# Patient Record
Sex: MALE | Race: BLACK OR AFRICAN AMERICAN | NOT HISPANIC OR LATINO | Employment: PART TIME | URBAN - METROPOLITAN AREA
[De-identification: names, ages, dates, MRNs, and addresses within clinical notes are randomized per-mention and may not be internally consistent; named-entity substitution may affect disease eponyms.]

---

## 2019-06-04 ENCOUNTER — OFFICE VISIT (OUTPATIENT)
Dept: PODIATRY | Facility: CLINIC | Age: 74
End: 2019-06-04
Payer: MEDICARE

## 2019-06-04 VITALS
DIASTOLIC BLOOD PRESSURE: 70 MMHG | BODY MASS INDEX: 32.64 KG/M2 | SYSTOLIC BLOOD PRESSURE: 128 MMHG | WEIGHT: 228 LBS | HEIGHT: 70 IN

## 2019-06-04 DIAGNOSIS — I73.9 PERIPHERAL VASCULAR DISEASE: Primary | ICD-10-CM

## 2019-06-04 DIAGNOSIS — M20.5X2 HALLUX LIMITUS, ACQUIRED, LEFT: ICD-10-CM

## 2019-06-04 DIAGNOSIS — B35.1 ONYCHOMYCOSIS DUE TO DERMATOPHYTE: ICD-10-CM

## 2019-06-04 DIAGNOSIS — M20.42 HAMMER TOES OF BOTH FEET: ICD-10-CM

## 2019-06-04 DIAGNOSIS — M20.5X1 HALLUX LIMITUS, ACQUIRED, RIGHT: ICD-10-CM

## 2019-06-04 DIAGNOSIS — R60.0 BILATERAL LOWER EXTREMITY EDEMA: ICD-10-CM

## 2019-06-04 DIAGNOSIS — M20.41 HAMMER TOES OF BOTH FEET: ICD-10-CM

## 2019-06-04 PROCEDURE — 11721 PR DEBRIDEMENT OF NAILS, 6 OR MORE: ICD-10-PCS | Mod: Q9,S$GLB,, | Performed by: PODIATRIST

## 2019-06-04 PROCEDURE — 1101F PR PT FALLS ASSESS DOC 0-1 FALLS W/OUT INJ PAST YR: ICD-10-PCS | Mod: CPTII,S$GLB,, | Performed by: PODIATRIST

## 2019-06-04 PROCEDURE — 11721 DEBRIDE NAIL 6 OR MORE: CPT | Mod: Q9,S$GLB,, | Performed by: PODIATRIST

## 2019-06-04 PROCEDURE — 1101F PT FALLS ASSESS-DOCD LE1/YR: CPT | Mod: CPTII,S$GLB,, | Performed by: PODIATRIST

## 2019-06-04 PROCEDURE — 3074F PR MOST RECENT SYSTOLIC BLOOD PRESSURE < 130 MM HG: ICD-10-PCS | Mod: CPTII,S$GLB,, | Performed by: PODIATRIST

## 2019-06-04 PROCEDURE — 99203 OFFICE O/P NEW LOW 30 MIN: CPT | Mod: 25,S$GLB,, | Performed by: PODIATRIST

## 2019-06-04 PROCEDURE — 99203 PR OFFICE/OUTPT VISIT, NEW, LEVL III, 30-44 MIN: ICD-10-PCS | Mod: 25,S$GLB,, | Performed by: PODIATRIST

## 2019-06-04 PROCEDURE — 3074F SYST BP LT 130 MM HG: CPT | Mod: CPTII,S$GLB,, | Performed by: PODIATRIST

## 2019-06-04 PROCEDURE — 99999 PR PBB SHADOW E&M-EST. PATIENT-LVL III: ICD-10-PCS | Mod: PBBFAC,,, | Performed by: PODIATRIST

## 2019-06-04 PROCEDURE — 3078F DIAST BP <80 MM HG: CPT | Mod: CPTII,S$GLB,, | Performed by: PODIATRIST

## 2019-06-04 PROCEDURE — 3078F PR MOST RECENT DIASTOLIC BLOOD PRESSURE < 80 MM HG: ICD-10-PCS | Mod: CPTII,S$GLB,, | Performed by: PODIATRIST

## 2019-06-04 PROCEDURE — 99999 PR PBB SHADOW E&M-EST. PATIENT-LVL III: CPT | Mod: PBBFAC,,, | Performed by: PODIATRIST

## 2019-06-05 NOTE — PROGRESS NOTES
Subjective:      Patient ID: Humberto Lopez is a 73 y.o. male.    Chief Complaint: Foot Problem (Pcp Dr. Coyne 9/24/18) and Nail Care    Humberto Quan is a 73 y.o. male who presents to the clinic for evaluation and treatment of high risk feet. Humberto Quan has a past medical history of *Atrial fibrillation, Hyperlipidemia, Hypertension, Pacemaker, and Positive D dimer. The patient's chief complaint is long, thick toenails. This patient has documented high risk feet requiring routine maintenance secondary to peripheral neuropathy.    PCP: Wolf Coyne MD    Date Last Seen by PCP: 9/24/18  Chief Complaint   Patient presents with    Foot Problem     Pcp Dr. Coyne 9/24/18    Nail Care       Current shoe gear:  Worn tennis shoes  Last encounter in this department: Visit date not found    No results found for: HGBA1C          Patient Active Problem List   Diagnosis    HTN (hypertension)    Atrial fibrillation    Hyperlipidemia    Cardiac pacemaker in situ    Sleep apnea       Current Outpatient Medications on File Prior to Visit   Medication Sig Dispense Refill    allopurinol (ZYLOPRIM) 300 MG tablet Take by mouth. 1 Tablet Oral Every day      amiodarone (PACERONE) 200 MG Tab TAKE 1 TABLET BY MOUTH DAILY 90 tablet 0    amlodipine (NORVASC) 5 MG tablet TAKE 1 TABLET BY MOUTH DAILY 90 tablet 0    aspirin (ECOTRIN) 81 MG EC tablet Take 81 mg by mouth once daily.      azelastine (ASTELIN) 137 mcg nasal spray once as needed. 1 Aerosol, Spray Nasal      carvedilol (COREG) 3.125 MG tablet TAKE 1 TABLET BY MOUTH TWICE DAILY WITH A MEAL (Patient taking differently: TAKE 1 TABLET BY MOUTH TWICE DAILY) 180 tablet 0    carvedilol (COREG) 3.125 MG tablet TAKE 1 TABLET BY MOUTH TWICE DAILY 180 tablet 0    cetirizine (ZYRTEC) 5 MG chewable tablet Take 5 mg by mouth once as needed for Allergies.      CRESTOR 10 mg tablet TAKE ONE TABLET BY MOUTH DAILY 30 tablet 0    dextroamphetamine-amphetamine (ADDERALL) 20 mg  tablet TK 1 T PO D  0    dorzolamide (TRUSOPT) 2 % ophthalmic solution   0    ergocalciferol (ERGOCALCIFEROL) 50,000 unit Cap Take 50,000 Units by mouth every 7 days.      finasteride (PROSCAR) 5 mg tablet Take 5 mg by mouth once daily.   4    fluorometholone 0.1% (FML) 0.1 % DrpS   1    fluticasone (FLONASE) 50 mcg/actuation nasal spray Inhale 1 spray into the lungs Daily.      furosemide (LASIX) 40 MG tablet TAKE 1 TABLET BY MOUTH DAILY 90 tablet 0    ketoconazole (NIZORAL) 2 % cream Apply topically 2 (two) times daily. Apply  to rash twice a day 30 g 1    latanoprost 0.005 % ophthalmic solution   0    levocetirizine (XYZAL) 5 MG tablet Take 5 mg by mouth every evening.      losartan (COZAAR) 100 MG tablet Take 1 tablet (100 mg total) by mouth once daily. 90 tablet 2    pantoprazole (PROTONIX) 40 MG tablet Take 40 mg by mouth once daily.      PATANOL 0.1 % ophthalmic solution   0    tamsulosin (FLOMAX) 0.4 mg Cp24 Take 1 tablet by mouth once daily.      tizanidine 2 mg Cap Take 2 mg by mouth 3 (three) times daily.      tramadol (ULTRAM) 50 mg tablet Take 50 mg by mouth every 6 (six) hours as needed for Pain.      trazodone (DESYREL) 50 MG tablet TK 1 T PO QHS  3     No current facility-administered medications on file prior to visit.        Review of patient's allergies indicates:   Allergen Reactions    No known drug allergies        Past Surgical History:   Procedure Laterality Date    A-V CARDIAC PACEMAKER INSERTION      APPENDECTOMY      CARPAL TUNNEL RELEASE      ELBOW SURGERY      TONSILLECTOMY         Family History   Problem Relation Age of Onset    Cancer Mother     Cancer Father     Heart disease Brother        Social History     Socioeconomic History    Marital status:      Spouse name: Not on file    Number of children: Not on file    Years of education: Not on file    Highest education level: Not on file   Occupational History    Not on file   Social Needs     "Financial resource strain: Not on file    Food insecurity:     Worry: Not on file     Inability: Not on file    Transportation needs:     Medical: Not on file     Non-medical: Not on file   Tobacco Use    Smoking status: Former Smoker    Smokeless tobacco: Never Used   Substance and Sexual Activity    Alcohol use: Yes     Comment: occasional    Drug use: No    Sexual activity: Not on file   Lifestyle    Physical activity:     Days per week: Not on file     Minutes per session: Not on file    Stress: Not on file   Relationships    Social connections:     Talks on phone: Not on file     Gets together: Not on file     Attends [employer] service: Not on file     Active member of club or organization: Not on file     Attends meetings of clubs or organizations: Not on file     Relationship status: Not on file   Other Topics Concern    Not on file   Social History Narrative    Not on file       Review of Systems   Constitution: Negative for chills and fever.   Cardiovascular: Positive for leg swelling. Negative for claudication.   Respiratory: Negative for cough and shortness of breath.    Skin: Positive for dry skin and nail changes. Negative for itching and rash.   Musculoskeletal: Positive for arthritis, back pain, joint pain, joint swelling, muscle weakness, myalgias, neck pain and stiffness. Negative for falls.   Gastrointestinal: Negative for diarrhea, nausea and vomiting.   Neurological: Positive for numbness and paresthesias. Negative for tremors and weakness.   Psychiatric/Behavioral: Negative for altered mental status and hallucinations.           Objective:      Vitals:    06/04/19 1537   BP: 128/70   Weight: 103.4 kg (228 lb)   Height: 5' 10" (1.778 m)   PainSc: 0-No pain       Physical Exam   Constitutional: He appears well-developed and well-nourished.  Non-toxic appearance. He does not have a sickly appearance. No distress.   alert and oriented x 3.    Cardiovascular:   Pulses:       Dorsalis " pedis pulses are 1+ on the right side, and 1+ on the left side.        Posterior tibial pulses are 1+ on the right side, and 1+ on the left side.    Dorsalis pedis and posterior tibial pulses are diminished Bilaterally. Toes are cool to touch. Feet are warm proximally.There is decreased digital hair. Skin is atrophic, slightly hyperpigmented, and mildly edematous     Pulmonary/Chest: No respiratory distress.   Musculoskeletal: He exhibits no deformity.        Right ankle: He exhibits swelling. No tenderness. No lateral malleolus, no medial malleolus, no AITFL, no CF ligament and no posterior TFL tenderness found. Achilles tendon exhibits no pain, no defect and normal Berry's test results.        Left ankle: He exhibits swelling. No tenderness. No lateral malleolus, no medial malleolus, no AITFL, no CF ligament and no posterior TFL tenderness found. Achilles tendon exhibits no pain, no defect and normal Berry's test results.        Right foot: There is no tenderness and no bony tenderness.        Left foot: There is no tenderness and no bony tenderness.   Muscle strength is 4/5 in all groups bilaterally.    There is equinus deformity bilateral with decreased dorsiflexion at the ankle joint bilateral. Gait analysis reveals excessive pronation through midstance and propulsion with early heel off. Shoes reveals lateral heel counter wear bilateral     Decreased first MPJ range of motion both weightbearing and nonweightbearing, no crepitus observed the first MP joint, + dorsal flag sign. Mild  bunion deformity is observed .    Patient has hammertoes of digits 2-5 bilateral partially reducible            Feet:   Right Foot:   Protective Sensation: 5 sites tested. 5 sites sensed.   Left Foot:   Protective Sensation: 5 sites tested. 5 sites sensed.   Lymphadenopathy:   No lymphatic streaking     Neurological: He displays no atrophy. A sensory deficit is present.   Paresthesias, and hyperesthesia bilateral feet at toes  with no clearly identified trigger or source.    [place] 5.07 monofilamant testing is diminished Brandon feet.        Skin: Skin is warm, dry and intact. No abrasion, no bruising, no lesion and no rash noted. He is not diaphoretic. No cyanosis or erythema. No pallor. Nails show no clubbing.   Toenails 1-5 bilaterally are elongated by 2-3 mm, thickened by 2-3 mm, discolored/yellowed, dystrophic, brittle with subungual debris. Tender to distal nail plate pressure, without periungual skin abnormality of each.     Psychiatric: His mood appears not anxious. His affect is not inappropriate. His speech is not slurred. He is not combative. He is communicative. He is attentive.   Nursing note and vitals reviewed.            Assessment:       Encounter Diagnoses   Name Primary?    Peripheral vascular disease Yes    Bilateral lower extremity edema     Onychomycosis due to dermatophyte     Hallux limitus, acquired, left     Hallux limitus, acquired, right     Hammer toes of both feet          Plan:     Problem List Items Addressed This Visit     None      Visit Diagnoses     Peripheral vascular disease    -  Primary    Bilateral lower extremity edema        Onychomycosis due to dermatophyte        Hallux limitus, acquired, left        Hallux limitus, acquired, right        Hammer toes of both feet             I counseled the patient on his conditions, their implications and medical management.         Greater than 50% of this visit spent on counseling and coordination of care.    Education about the diabetic foot, neuropathy, and prevention of limb loss.    Shoe inspection. Patient instructed on proper foot hygeine. Wear comfortable, proper fitting shoes. Wash feet daily. Dry well. After drying, apply moisturizer to feet (no lotion to webspaces). Inspect feet daily for skin breaks, blisters, swelling, or redness. Wear cotton socks (preferably white)  Change socks every day. Do NOT walk barefoot. Do NOT use heating  pads or hot water soaks. We discussed wearing proper shoe gear, daily foot inspections, never walking without protective shoe gear. The patient is asked to make an attempt to improve diet and exercise patterns to aid in medical management of this problem.    Discussed edema control and the importance of daily moisturizer to the feet such as Gold bonds diabetic foot cream    Recommend applying vicks vaporub to thick abnormal toenails daily x 6 months to treat fungal nail infection.    With patient's permission, nails were aggressively reduced and debrided x 10 to their soft tissue attachment mechanically and with electric , removing all offending nail and debris. Patient relates relief following the procedure.     He will continue to monitor the areas daily, inspect his feet, wear protective shoe gear when ambulatory, moisturizer to maintain skin integrity and follow in this office in approximately 3-5 months, sooner p.r.n.

## 2020-10-26 ENCOUNTER — HOSPITAL ENCOUNTER (EMERGENCY)
Facility: HOSPITAL | Age: 75
Discharge: HOME | End: 2020-10-26
Attending: EMERGENCY MEDICINE
Payer: MEDICARE

## 2020-10-26 VITALS
DIASTOLIC BLOOD PRESSURE: 76 MMHG | HEART RATE: 70 BPM | OXYGEN SATURATION: 95 % | TEMPERATURE: 98 F | BODY MASS INDEX: 30.13 KG/M2 | RESPIRATION RATE: 16 BRPM | WEIGHT: 210 LBS | SYSTOLIC BLOOD PRESSURE: 179 MMHG

## 2020-10-26 DIAGNOSIS — S81.802A AVULSION OF SKIN OF LEFT LOWER LEG, INITIAL ENCOUNTER: Primary | ICD-10-CM

## 2020-10-26 PROCEDURE — 99283 EMERGENCY DEPT VISIT LOW MDM: CPT | Mod: ER

## 2020-10-26 PROCEDURE — 25000003 PHARM REV CODE 250: Mod: ER | Performed by: NURSE PRACTITIONER

## 2020-10-26 RX ORDER — MUPIROCIN 20 MG/G
OINTMENT TOPICAL
Status: COMPLETED | OUTPATIENT
Start: 2020-10-26 | End: 2020-10-26

## 2020-10-26 RX ADMIN — MUPIROCIN: 20 OINTMENT TOPICAL at 05:10

## 2020-10-26 NOTE — ED PROVIDER NOTES
"Encounter Date: 10/26/2020    SCRIBE #1 NOTE: I, Felicity Casanovalayo, am scribing for, and in the presence of,  ELIESER Antonio. I have scribed the following portions of the note - Other sections scribed: HPI, ROS, PE.       History     Chief Complaint   Patient presents with    leg wound     Pt states," I have a wound on my left leg that has opened."     This is a nontoxic appearing 75 y.o. male who presents to the ED complaining of an open wound to the left leg onset today. Patient reports he initially had a burn to his left leg that healed and was treated and states he recently rubbed his leg against something, causing the wound to open.     The history is provided by the patient. No  was used.   General Injury   The incident occurred 3 to 5 hours ago. The incident occurred at home. Injury mechanism: hit leg against door-old wound opened. There is an injury to the left lower leg. The pain is at a severity of 0/10. Pertinent negatives include no chest pain, no nausea, no vomiting, no seizures, no weakness and no cough. His tetanus status is UTD.     Review of patient's allergies indicates:   Allergen Reactions    No known drug allergies      Past Medical History:   Diagnosis Date    *Atrial fibrillation     Hyperlipidemia     Hypertension     Pacemaker     Positive D dimer      Past Surgical History:   Procedure Laterality Date    A-V CARDIAC PACEMAKER INSERTION      APPENDECTOMY      CARPAL TUNNEL RELEASE      ELBOW SURGERY      TONSILLECTOMY       Family History   Problem Relation Age of Onset    Cancer Mother     Cancer Father     Heart disease Brother      Social History     Tobacco Use    Smoking status: Former Smoker    Smokeless tobacco: Never Used   Substance Use Topics    Alcohol use: Yes     Comment: occasional    Drug use: No     Review of Systems   Constitutional: Negative.  Negative for fever.   HENT: Negative.    Eyes: Negative.    Respiratory: Negative.  Negative for " cough and shortness of breath.    Cardiovascular: Negative.  Negative for chest pain and leg swelling.   Gastrointestinal: Negative.  Negative for nausea and vomiting.   Endocrine: Negative.    Genitourinary: Negative.    Musculoskeletal: Negative.  Negative for myalgias.   Skin: Positive for wound (left leg).   Allergic/Immunologic: Negative.    Neurological: Negative.  Negative for seizures and weakness.   Hematological: Negative.    Psychiatric/Behavioral: Negative.    All other systems reviewed and are negative.      Physical Exam     Initial Vitals [10/26/20 1634]   BP Pulse Resp Temp SpO2   (!) 179/76 70 16 98.1 °F (36.7 °C) 95 %      MAP       --         Physical Exam    Nursing note and vitals reviewed.  Constitutional: He appears well-developed.   HENT:   Head: Normocephalic.   Nose: Nose normal.   Mouth/Throat: Oropharynx is clear and moist.   Eyes: Conjunctivae are normal.   Neck: Normal range of motion. Neck supple.   Cardiovascular: Normal rate, regular rhythm, S1 normal, S2 normal and normal heart sounds. Exam reveals no gallop and no friction rub.    No murmur heard.  Pulmonary/Chest: Breath sounds normal. No respiratory distress. He has no wheezes. He has no rhonchi. He has no rales.   Abdominal: Soft. Bowel sounds are normal. There is no abdominal tenderness.   Musculoskeletal: Normal range of motion.      Comments: 2 cm skin break with no drainage or any evidence of infection noted.   Neurological: He is alert and oriented to person, place, and time.   Skin: Skin is warm and dry.   2 cm skin break noted to left lateral leg.  Loose skin removed.   LLE with FROM. No deformity    Psychiatric: He has a normal mood and affect. His behavior is normal.         ED Course   Procedures  Labs Reviewed - No data to display       Imaging Results    None          Medical Decision Making:   History:   Old Medical Records: I decided to obtain old medical records.  Initial Assessment:   This is a nontoxic appearing  75 y.o. male who presents to the ED complaining of an open wound to the left leg onset today. Patient reports he initially had a burn to his left leg that healed and was treated and states he recently rubbed his leg against something, causing the wound to open.   Differential Diagnosis:   Cellulitis. Abscess. Skin tear  ED Management:  Physical exam.  Wound care performed.  Bactroban applied.  Discharge with Bactroban.  Patient instructed on daily wound care.  Follow-up with PCP in 2 days.             Scribe Attestation:   Scribe #1: I performed the above scribed service and the documentation accurately describes the services I performed. I attest to the accuracy of the note.    This document was produced by a scribe under my direction and in my presence. I agree with the content of the note and have made any necessary edits.     ELIESER Antonio    10/26/2020 9:49 PM                  Clinical Impression:       ICD-10-CM ICD-9-CM   1. Avulsion of skin of left lower leg, initial encounter  S81.802A 891.0                          ED Disposition Condition    Discharge Stable        ED Prescriptions     None        Follow-up Information     Follow up With Specialties Details Why Contact Info    Wolf Coyne MD Internal Medicine In 2 days  [address]  4th Floor  [facility] [address]  [phone]                                         ELIESER Henry  10/26/20 4701

## 2022-11-19 ENCOUNTER — HOSPITAL ENCOUNTER (OUTPATIENT)
Facility: HOSPITAL | Age: 77
Discharge: HOME | End: 2022-11-21
Attending: STUDENT IN AN ORGANIZED HEALTH CARE EDUCATION/TRAINING PROGRAM | Admitting: STUDENT IN AN ORGANIZED HEALTH CARE EDUCATION/TRAINING PROGRAM
Payer: MEDICARE

## 2022-11-19 DIAGNOSIS — E78.5 HYPERLIPIDEMIA: ICD-10-CM

## 2022-11-19 DIAGNOSIS — R05.9 COUGH: ICD-10-CM

## 2022-11-19 DIAGNOSIS — I48.0 PAROXYSMAL ATRIAL FIBRILLATION: ICD-10-CM

## 2022-11-19 DIAGNOSIS — Z95.0 CARDIAC PACEMAKER IN SITU: ICD-10-CM

## 2022-11-19 DIAGNOSIS — J06.9 URI (UPPER RESPIRATORY INFECTION): ICD-10-CM

## 2022-11-19 DIAGNOSIS — G47.30 SLEEP APNEA: ICD-10-CM

## 2022-11-19 DIAGNOSIS — I10 ESSENTIAL HYPERTENSION: ICD-10-CM

## 2022-11-19 DIAGNOSIS — E87.70 VOLUME OVERLOAD: Primary | ICD-10-CM

## 2022-11-19 DIAGNOSIS — E87.70 FLUID OVERLOAD, UNSPECIFIED: ICD-10-CM

## 2022-11-19 DIAGNOSIS — R07.9 CHEST PAIN: ICD-10-CM

## 2022-11-19 LAB
ALBUMIN SERPL BCP-MCNC: 3.6 G/DL (ref 3.5–5.2)
ALP SERPL-CCNC: 56 U/L (ref 55–135)
ALT SERPL W/O P-5'-P-CCNC: 15 U/L (ref 10–44)
ANION GAP SERPL CALC-SCNC: 6 MMOL/L (ref 8–16)
AST SERPL-CCNC: 11 U/L (ref 10–40)
BASOPHILS # BLD AUTO: 0.03 K/UL (ref 0–0.2)
BASOPHILS NFR BLD: 0.4 % (ref 0–1.9)
BILIRUB SERPL-MCNC: 0.3 MG/DL (ref 0.1–1)
BNP SERPL-MCNC: 233 PG/ML (ref 0–99)
BUN SERPL-MCNC: 14 MG/DL (ref 8–23)
CALCIUM SERPL-MCNC: 8.6 MG/DL (ref 8.7–10.5)
CHLORIDE SERPL-SCNC: 99 MMOL/L (ref 95–110)
CO2 SERPL-SCNC: 35 MMOL/L (ref 23–29)
CREAT SERPL-MCNC: 0.8 MG/DL (ref 0.5–1.4)
D DIMER PPP IA.FEU-MCNC: 0.63 MG/L FEU
DIFFERENTIAL METHOD: ABNORMAL
EOSINOPHIL # BLD AUTO: 0.1 K/UL (ref 0–0.5)
EOSINOPHIL NFR BLD: 1.3 % (ref 0–8)
ERYTHROCYTE [DISTWIDTH] IN BLOOD BY AUTOMATED COUNT: 12.8 % (ref 11.5–14.5)
EST. GFR  (NO RACE VARIABLE): >60 ML/MIN/1.73 M^2
GLUCOSE SERPL-MCNC: 144 MG/DL (ref 70–110)
HCT VFR BLD AUTO: 41.4 % (ref 40–54)
HGB BLD-MCNC: 13.4 G/DL (ref 14–18)
IMM GRANULOCYTES # BLD AUTO: 0.03 K/UL (ref 0–0.04)
IMM GRANULOCYTES NFR BLD AUTO: 0.4 % (ref 0–0.5)
LYMPHOCYTES # BLD AUTO: 2.1 K/UL (ref 1–4.8)
LYMPHOCYTES NFR BLD: 27.2 % (ref 18–48)
MAGNESIUM SERPL-MCNC: 1.9 MG/DL (ref 1.6–2.6)
MCH RBC QN AUTO: 30.4 PG (ref 27–31)
MCHC RBC AUTO-ENTMCNC: 32.4 G/DL (ref 32–36)
MCV RBC AUTO: 94 FL (ref 82–98)
MONOCYTES # BLD AUTO: 0.7 K/UL (ref 0.3–1)
MONOCYTES NFR BLD: 9.3 % (ref 4–15)
NEUTROPHILS # BLD AUTO: 4.6 K/UL (ref 1.8–7.7)
NEUTROPHILS NFR BLD: 61.4 % (ref 38–73)
NRBC BLD-RTO: 0 /100 WBC
PLATELET # BLD AUTO: 152 K/UL (ref 150–450)
PMV BLD AUTO: 10.4 FL (ref 9.2–12.9)
POTASSIUM SERPL-SCNC: 4.3 MMOL/L (ref 3.5–5.1)
PROT SERPL-MCNC: 6.5 G/DL (ref 6–8.4)
RBC # BLD AUTO: 4.41 M/UL (ref 4.6–6.2)
SODIUM SERPL-SCNC: 140 MMOL/L (ref 136–145)
TROPONIN I SERPL DL<=0.01 NG/ML-MCNC: 0.01 NG/ML (ref 0–0.03)
WBC # BLD AUTO: 7.54 K/UL (ref 3.9–12.7)

## 2022-11-19 PROCEDURE — 99285 EMERGENCY DEPT VISIT HI MDM: CPT | Mod: 25

## 2022-11-19 PROCEDURE — 83880 ASSAY OF NATRIURETIC PEPTIDE: CPT | Performed by: STUDENT IN AN ORGANIZED HEALTH CARE EDUCATION/TRAINING PROGRAM

## 2022-11-19 PROCEDURE — 93005 ELECTROCARDIOGRAM TRACING: CPT

## 2022-11-19 PROCEDURE — 93010 ELECTROCARDIOGRAM REPORT: CPT | Mod: ,,, | Performed by: INTERNAL MEDICINE

## 2022-11-19 PROCEDURE — G0378 HOSPITAL OBSERVATION PER HR: HCPCS

## 2022-11-19 PROCEDURE — 83735 ASSAY OF MAGNESIUM: CPT | Performed by: STUDENT IN AN ORGANIZED HEALTH CARE EDUCATION/TRAINING PROGRAM

## 2022-11-19 PROCEDURE — 85025 COMPLETE CBC W/AUTO DIFF WBC: CPT | Performed by: STUDENT IN AN ORGANIZED HEALTH CARE EDUCATION/TRAINING PROGRAM

## 2022-11-19 PROCEDURE — 85379 FIBRIN DEGRADATION QUANT: CPT | Performed by: STUDENT IN AN ORGANIZED HEALTH CARE EDUCATION/TRAINING PROGRAM

## 2022-11-19 PROCEDURE — 80053 COMPREHEN METABOLIC PANEL: CPT | Performed by: STUDENT IN AN ORGANIZED HEALTH CARE EDUCATION/TRAINING PROGRAM

## 2022-11-19 PROCEDURE — 93010 EKG 12-LEAD: ICD-10-PCS | Mod: ,,, | Performed by: INTERNAL MEDICINE

## 2022-11-19 PROCEDURE — 96374 THER/PROPH/DIAG INJ IV PUSH: CPT

## 2022-11-19 PROCEDURE — 84484 ASSAY OF TROPONIN QUANT: CPT | Performed by: STUDENT IN AN ORGANIZED HEALTH CARE EDUCATION/TRAINING PROGRAM

## 2022-11-19 PROCEDURE — 63600175 PHARM REV CODE 636 W HCPCS: Performed by: STUDENT IN AN ORGANIZED HEALTH CARE EDUCATION/TRAINING PROGRAM

## 2022-11-19 RX ORDER — FUROSEMIDE 10 MG/ML
80 INJECTION INTRAMUSCULAR; INTRAVENOUS
Status: COMPLETED | OUTPATIENT
Start: 2022-11-19 | End: 2022-11-19

## 2022-11-19 RX ADMIN — FUROSEMIDE 80 MG: 10 INJECTION, SOLUTION INTRAVENOUS at 11:11

## 2022-11-20 LAB
ALBUMIN SERPL BCP-MCNC: 3.9 G/DL (ref 3.5–5.2)
ALP SERPL-CCNC: 53 U/L (ref 55–135)
ALT SERPL W/O P-5'-P-CCNC: 17 U/L (ref 10–44)
ANION GAP SERPL CALC-SCNC: 12 MMOL/L (ref 8–16)
ASCENDING AORTA: 3.29 CM
AST SERPL-CCNC: 15 U/L (ref 10–40)
AV INDEX (PROSTH): 0.92
AV MEAN GRADIENT: 2 MMHG
AV PEAK GRADIENT: 4 MMHG
AV VALVE AREA: 2.82 CM2
AV VELOCITY RATIO: 0.84
BILIRUB SERPL-MCNC: 0.5 MG/DL (ref 0.1–1)
BILIRUB UR QL STRIP: NEGATIVE
BSA FOR ECHO PROCEDURE: 2.08 M2
BUN SERPL-MCNC: 13 MG/DL (ref 8–23)
CALCIUM SERPL-MCNC: 9.1 MG/DL (ref 8.7–10.5)
CHLORIDE SERPL-SCNC: 96 MMOL/L (ref 95–110)
CLARITY UR: CLEAR
CO2 SERPL-SCNC: 37 MMOL/L (ref 23–29)
COLOR UR: YELLOW
CREAT SERPL-MCNC: 0.7 MG/DL (ref 0.5–1.4)
CV ECHO LV RWT: 0.57 CM
DOP CALC AO PEAK VEL: 1.05 M/S
DOP CALC AO VTI: 15.6 CM
DOP CALC LVOT AREA: 3.1 CM2
DOP CALC LVOT DIAMETER: 1.98 CM
DOP CALC LVOT PEAK VEL: 0.88 M/S
DOP CALC LVOT STROKE VOLUME: 44.01 CM3
DOP CALCLVOT PEAK VEL VTI: 14.3 CM
ECHO LV POSTERIOR WALL: 1.15 CM (ref 0.6–1.1)
EJECTION FRACTION: 55 %
EST. GFR  (NO RACE VARIABLE): >60 ML/MIN/1.73 M^2
FRACTIONAL SHORTENING: 26 % (ref 28–44)
GLUCOSE SERPL-MCNC: 98 MG/DL (ref 70–110)
GLUCOSE UR QL STRIP: NEGATIVE
HGB UR QL STRIP: NEGATIVE
INTERVENTRICULAR SEPTUM: 1.39 CM (ref 0.6–1.1)
IVC DIAMETER: 2.4 CM
KETONES UR QL STRIP: NEGATIVE
LA MAJOR: 5.96 CM
LA MINOR: 6.01 CM
LA WIDTH: 4.5 CM
LEFT ATRIUM SIZE: 3.92 CM
LEFT ATRIUM VOLUME INDEX: 43.6 ML/M2
LEFT ATRIUM VOLUME: 89.74 CM3
LEFT INTERNAL DIMENSION IN SYSTOLE: 2.97 CM (ref 2.1–4)
LEFT VENTRICLE DIASTOLIC VOLUME INDEX: 34.64 ML/M2
LEFT VENTRICLE DIASTOLIC VOLUME: 71.35 ML
LEFT VENTRICLE MASS INDEX: 88 G/M2
LEFT VENTRICLE SYSTOLIC VOLUME INDEX: 16.5 ML/M2
LEFT VENTRICLE SYSTOLIC VOLUME: 34.04 ML
LEFT VENTRICULAR INTERNAL DIMENSION IN DIASTOLE: 4.03 CM (ref 3.5–6)
LEFT VENTRICULAR MASS: 182.09 G
LEUKOCYTE ESTERASE UR QL STRIP: NEGATIVE
LVOT MG: 1.8 MMHG
LVOT MV: 0.64 CM/S
MAGNESIUM SERPL-MCNC: 1.9 MG/DL (ref 1.6–2.6)
MV PEAK E VEL: 0.65 M/S
NITRITE UR QL STRIP: NEGATIVE
PH UR STRIP: 6 [PH] (ref 5–8)
PHOSPHATE SERPL-MCNC: 5.2 MG/DL (ref 2.7–4.5)
PISA TR MAX VEL: 2.37 M/S
POTASSIUM SERPL-SCNC: 4.4 MMOL/L (ref 3.5–5.1)
PROT SERPL-MCNC: 7.2 G/DL (ref 6–8.4)
PROT UR QL STRIP: NEGATIVE
PV PEAK VELOCITY: 0.91 CM/S
RA MAJOR: 5.95 CM
RA PRESSURE: 15 MMHG
RA WIDTH: 3.6 CM
RIGHT VENTRICULAR END-DIASTOLIC DIMENSION: 3.74 CM
RV TISSUE DOPPLER FREE WALL SYSTOLIC VELOCITY 1 (APICAL 4 CHAMBER VIEW): 0.01 CM/S
SINUS: 3.58 CM
SODIUM SERPL-SCNC: 145 MMOL/L (ref 136–145)
SP GR UR STRIP: 1.01 (ref 1–1.03)
STJ: 2.43 CM
TR MAX PG: 22 MMHG
TRICUSPID ANNULAR PLANE SYSTOLIC EXCURSION: 1.19 CM
TV REST PULMONARY ARTERY PRESSURE: 37 MMHG
URN SPEC COLLECT METH UR: NORMAL
UROBILINOGEN UR STRIP-ACNC: NEGATIVE EU/DL

## 2022-11-20 PROCEDURE — 25000003 PHARM REV CODE 250: Performed by: NURSE PRACTITIONER

## 2022-11-20 PROCEDURE — G0378 HOSPITAL OBSERVATION PER HR: HCPCS

## 2022-11-20 PROCEDURE — 25000003 PHARM REV CODE 250: Performed by: HOSPITALIST

## 2022-11-20 PROCEDURE — 97165 OT EVAL LOW COMPLEX 30 MIN: CPT

## 2022-11-20 PROCEDURE — 36415 COLL VENOUS BLD VENIPUNCTURE: CPT | Performed by: NURSE PRACTITIONER

## 2022-11-20 PROCEDURE — 96372 THER/PROPH/DIAG INJ SC/IM: CPT | Performed by: NURSE PRACTITIONER

## 2022-11-20 PROCEDURE — 96376 TX/PRO/DX INJ SAME DRUG ADON: CPT

## 2022-11-20 PROCEDURE — 83735 ASSAY OF MAGNESIUM: CPT | Performed by: NURSE PRACTITIONER

## 2022-11-20 PROCEDURE — 81003 URINALYSIS AUTO W/O SCOPE: CPT | Performed by: STUDENT IN AN ORGANIZED HEALTH CARE EDUCATION/TRAINING PROGRAM

## 2022-11-20 PROCEDURE — 80053 COMPREHEN METABOLIC PANEL: CPT | Performed by: NURSE PRACTITIONER

## 2022-11-20 PROCEDURE — 63600175 PHARM REV CODE 636 W HCPCS: Performed by: HOSPITALIST

## 2022-11-20 PROCEDURE — 97530 THERAPEUTIC ACTIVITIES: CPT

## 2022-11-20 PROCEDURE — 84100 ASSAY OF PHOSPHORUS: CPT | Performed by: NURSE PRACTITIONER

## 2022-11-20 PROCEDURE — 63600175 PHARM REV CODE 636 W HCPCS: Performed by: NURSE PRACTITIONER

## 2022-11-20 RX ORDER — FUROSEMIDE 10 MG/ML
20 INJECTION INTRAMUSCULAR; INTRAVENOUS
Status: DISCONTINUED | OUTPATIENT
Start: 2022-11-20 | End: 2022-11-21 | Stop reason: HOSPADM

## 2022-11-20 RX ORDER — NALOXONE HCL 0.4 MG/ML
0.02 VIAL (ML) INJECTION
Status: DISCONTINUED | OUTPATIENT
Start: 2022-11-20 | End: 2022-11-21 | Stop reason: HOSPADM

## 2022-11-20 RX ORDER — LEVOTHYROXINE SODIUM 50 UG/1
50 TABLET ORAL
COMMUNITY

## 2022-11-20 RX ORDER — GABAPENTIN 300 MG/1
300 CAPSULE ORAL 2 TIMES DAILY
Status: DISCONTINUED | OUTPATIENT
Start: 2022-11-20 | End: 2022-11-21 | Stop reason: HOSPADM

## 2022-11-20 RX ORDER — IBUPROFEN 400 MG/1
400 TABLET ORAL EVERY 6 HOURS PRN
Status: DISCONTINUED | OUTPATIENT
Start: 2022-11-20 | End: 2022-11-21 | Stop reason: HOSPADM

## 2022-11-20 RX ORDER — TALC
6 POWDER (GRAM) TOPICAL NIGHTLY PRN
Status: DISCONTINUED | OUTPATIENT
Start: 2022-11-20 | End: 2022-11-21 | Stop reason: HOSPADM

## 2022-11-20 RX ORDER — HYDRALAZINE HYDROCHLORIDE 25 MG/1
50 TABLET, FILM COATED ORAL EVERY 8 HOURS
Status: DISCONTINUED | OUTPATIENT
Start: 2022-11-20 | End: 2022-11-21 | Stop reason: HOSPADM

## 2022-11-20 RX ORDER — TADALAFIL 10 MG/1
10 TABLET ORAL DAILY PRN
COMMUNITY

## 2022-11-20 RX ORDER — ACETAMINOPHEN 325 MG/1
650 TABLET ORAL EVERY 4 HOURS PRN
Status: DISCONTINUED | OUTPATIENT
Start: 2022-11-20 | End: 2022-11-21 | Stop reason: HOSPADM

## 2022-11-20 RX ORDER — GLUCAGON 1 MG
1 KIT INJECTION
Status: DISCONTINUED | OUTPATIENT
Start: 2022-11-20 | End: 2022-11-21 | Stop reason: HOSPADM

## 2022-11-20 RX ORDER — ONDANSETRON 2 MG/ML
4 INJECTION INTRAMUSCULAR; INTRAVENOUS EVERY 8 HOURS PRN
Status: DISCONTINUED | OUTPATIENT
Start: 2022-11-20 | End: 2022-11-21 | Stop reason: HOSPADM

## 2022-11-20 RX ORDER — IBUPROFEN 200 MG
24 TABLET ORAL
Status: DISCONTINUED | OUTPATIENT
Start: 2022-11-20 | End: 2022-11-21 | Stop reason: HOSPADM

## 2022-11-20 RX ORDER — LEVOTHYROXINE SODIUM 50 UG/1
50 TABLET ORAL
Status: DISCONTINUED | OUTPATIENT
Start: 2022-11-20 | End: 2022-11-21 | Stop reason: HOSPADM

## 2022-11-20 RX ORDER — ASPIRIN 81 MG/1
81 TABLET ORAL DAILY
Status: DISCONTINUED | OUTPATIENT
Start: 2022-11-20 | End: 2022-11-21 | Stop reason: HOSPADM

## 2022-11-20 RX ORDER — FUROSEMIDE 10 MG/ML
40 INJECTION INTRAMUSCULAR; INTRAVENOUS
Status: DISCONTINUED | OUTPATIENT
Start: 2022-11-20 | End: 2022-11-20

## 2022-11-20 RX ORDER — ENOXAPARIN SODIUM 100 MG/ML
40 INJECTION SUBCUTANEOUS EVERY 24 HOURS
Status: DISCONTINUED | OUTPATIENT
Start: 2022-11-20 | End: 2022-11-21 | Stop reason: HOSPADM

## 2022-11-20 RX ORDER — GABAPENTIN 300 MG/1
300 CAPSULE ORAL 2 TIMES DAILY
COMMUNITY

## 2022-11-20 RX ORDER — IBUPROFEN 200 MG
16 TABLET ORAL
Status: DISCONTINUED | OUTPATIENT
Start: 2022-11-20 | End: 2022-11-21 | Stop reason: HOSPADM

## 2022-11-20 RX ORDER — POTASSIUM CHLORIDE 1.5 G/1.58G
20 POWDER, FOR SOLUTION ORAL 2 TIMES DAILY
Status: ON HOLD | COMMUNITY
End: 2022-11-21 | Stop reason: HOSPADM

## 2022-11-20 RX ORDER — AMIODARONE HYDROCHLORIDE 200 MG/1
200 TABLET ORAL DAILY
Status: DISCONTINUED | OUTPATIENT
Start: 2022-11-20 | End: 2022-11-21 | Stop reason: HOSPADM

## 2022-11-20 RX ORDER — ATORVASTATIN CALCIUM 40 MG/1
40 TABLET, FILM COATED ORAL DAILY
Status: DISCONTINUED | OUTPATIENT
Start: 2022-11-20 | End: 2022-11-21 | Stop reason: HOSPADM

## 2022-11-20 RX ORDER — AMLODIPINE BESYLATE 5 MG/1
5 TABLET ORAL DAILY
Status: DISCONTINUED | OUTPATIENT
Start: 2022-11-20 | End: 2022-11-20

## 2022-11-20 RX ORDER — CARVEDILOL 6.25 MG/1
6.25 TABLET ORAL 2 TIMES DAILY
Status: DISCONTINUED | OUTPATIENT
Start: 2022-11-20 | End: 2022-11-21 | Stop reason: HOSPADM

## 2022-11-20 RX ORDER — BACLOFEN 10 MG/1
10 TABLET ORAL 4 TIMES DAILY PRN
COMMUNITY

## 2022-11-20 RX ORDER — SODIUM CHLORIDE 0.9 % (FLUSH) 0.9 %
10 SYRINGE (ML) INJECTION EVERY 12 HOURS PRN
Status: DISCONTINUED | OUTPATIENT
Start: 2022-11-20 | End: 2022-11-21 | Stop reason: HOSPADM

## 2022-11-20 RX ADMIN — ENOXAPARIN SODIUM 40 MG: 100 INJECTION SUBCUTANEOUS at 04:11

## 2022-11-20 RX ADMIN — ASPIRIN 81 MG: 81 TABLET, DELAYED RELEASE ORAL at 08:11

## 2022-11-20 RX ADMIN — GABAPENTIN 300 MG: 300 CAPSULE ORAL at 08:11

## 2022-11-20 RX ADMIN — FUROSEMIDE 20 MG: 10 INJECTION, SOLUTION INTRAMUSCULAR; INTRAVENOUS at 04:11

## 2022-11-20 RX ADMIN — ATORVASTATIN CALCIUM 40 MG: 40 TABLET, FILM COATED ORAL at 08:11

## 2022-11-20 RX ADMIN — LEVOTHYROXINE SODIUM 50 MCG: 50 TABLET ORAL at 05:11

## 2022-11-20 RX ADMIN — AMIODARONE HYDROCHLORIDE 200 MG: 200 TABLET ORAL at 08:11

## 2022-11-20 RX ADMIN — FUROSEMIDE 40 MG: 10 INJECTION, SOLUTION INTRAMUSCULAR; INTRAVENOUS at 05:11

## 2022-11-20 RX ADMIN — CARVEDILOL 6.25 MG: 6.25 TABLET, FILM COATED ORAL at 08:11

## 2022-11-20 RX ADMIN — HYDRALAZINE HYDROCHLORIDE 50 MG: 25 TABLET, FILM COATED ORAL at 09:11

## 2022-11-20 NOTE — PLAN OF CARE
[facility]  Discharge Assessment  Mirta Lopez (Spouse)   [phone] (Mobile), Did not answer, [place] left a detailed message requesting a call back.  Primary Care Provider: Wlof Coyne MD     Discharge Assessment (most recent)       BRIEF DISCHARGE ASSESSMENT - 11/20/22 7905          Discharge Planning    Assessment Type Discharge Planning Brief Assessment     Equipment Currently Used at Home wheelchair;slide board;power chair;oxygen;hospital bed     Current Living Arrangements home/apartment/condo     Patient/Family Anticipates Transition to home with family     DME Needed Upon Discharge  other (see comments)   tbd

## 2022-11-20 NOTE — NURSING
[facility] [place]  Nurses Note -- 4 Eyes      11/20/2022       Skin assessed on: Admit      [] No Pressure Injuries Present    []Prevention Measures Documented    [x] Yes LDA  for Pressure Injury Previously documented     [] Yes New Pressure Injury Discovered   [] LDA for New Pressure Injury Added      Attending RN:  Jesus Cabello LPN     Second RN:  Marva Maya RN

## 2022-11-20 NOTE — PLAN OF CARE
Problem: Occupational Therapy  Goal: Occupational Therapy Goal  Description: Goals to be met by: 12/04/22     Patient will increase functional independence with ADLs by performing:    Feeding with Modified [place].  UE Dressing with Modified [place].  LE Dressing with Modified [place].  Grooming while seated with Modified [place].  Toileting from toilet with Modified [place] for hygiene and clothing management.   Supine to sit with Modified [place].  Squat pivot transfers with Supervision.  Toilet transfer to bedside commode with Supervision.  Upper extremity exercise program x15 reps per handout, with assistance as needed.    Outcome: Ongoing, Progressing     CGA scoot pivot bed>personal power chair on 1L O2. Pt c/o SOB- spo2 94-95%. Pt required MIN A power chair>bed. Pt set-up with lunch using LUE.

## 2022-11-20 NOTE — H&P
[facility] Medicine  History & Physical    Patient Name: Humberto Lopez  MRN: [id number]  Patient Class: OP- Observation  Admission Date: 11/19/2022  Attending Physician: Steve Ames III, MD   Primary Care Provider: Wolf Coyne MD         Patient information was obtained from patient, past medical records and ER records.     Subjective:     Principal Problem:Fluid overload, unspecified    Chief Complaint:   Chief Complaint   Patient presents with    Cough     Presents with complaint of nonproductive cough x 3 weeks    Diarrhea     Also complaining of diarrhea and weakness x 1 month, Seen at PCP yesterday for complaint of back pain        HPI: 78 y/o male who has been having diarrhea for the past 3 weeks with 3-4 BM's a day  and SOB for the past 2-3 weeks, plus leg swelling. He was seen by jis PCP Dr ANNALISE Coyne on 11-18-22 and given RX for lomtil with orders for labs and referral to ambulatory GI.   He presents to the the ED at [facility] 0 11-19-22 with same c/s's. Also c/o trouble sleeping, lack of appetite, and decrease in urine    Patient is incomplete quad and is w/c level ambulation so legs are dependant all the time.     ED w/u reveals CXR possible fluid overload so was given Lasix 80 mg IV, , UA normal, glucose 144, d-dimer 0.63 but age adjusted OK, EKG afib    Patient is being admitted to Hospital medicine OBS for fluid overload and diuresing.          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     Current Outpatient Medications on File Prior to Encounter   Medication Sig    amlodipine (NORVASC) 5 MG tablet TAKE 1 TABLET BY MOUTH DAILY    levocetirizine (XYZAL) 5 MG tablet Take 5 mg by mouth every evening.    pantoprazole (PROTONIX) 40 MG tablet Take 40 mg by mouth once daily.    tamsulosin (FLOMAX) 0.4 mg Cp24 Take 1 tablet by mouth once daily.    allopurinol (ZYLOPRIM) 300 MG tablet Take by mouth. 1 Tablet Oral Every day    amiodarone (PACERONE) 200 MG Tab TAKE 1 TABLET BY MOUTH DAILY (Patient not taking: Reported on 11/19/2022)    aspirin (ECOTRIN) 81 MG EC tablet Take 81 mg by mouth once daily.    azelastine (ASTELIN) 137 mcg nasal spray once as needed. 1 Aerosol, Spray Nasal    baclofen (LIORESAL) 10 MG tablet Take 10 mg by mouth 4 (four) times daily as needed.    carvedilol (COREG) 3.125 MG tablet TAKE 1 TABLET BY MOUTH TWICE DAILY WITH A MEAL (Patient taking differently: Take 6.25 mg by mouth 2 (two) times daily.)    cetirizine (ZYRTEC) 5 MG chewable tablet Take 5 mg by mouth once as needed for Allergies.    CRESTOR 10 mg tablet TAKE ONE TABLET BY MOUTH DAILY    dextroamphetamine-amphetamine (ADDERALL) 20 mg tablet TK 1 T PO D    dorzolamide (TRUSOPT) 2 % ophthalmic solution     ergocalciferol (ERGOCALCIFEROL) 50,000 unit Cap Take 50,000 Units by mouth every 7 days.    finasteride (PROSCAR) 5 mg tablet Take 5 mg by mouth every evening.    fluorometholone 0.1% (FML) 0.1 % DrpS     fluticasone (FLONASE) 50 mcg/actuation nasal spray Inhale 1 spray into the lungs Daily.    furosemide (LASIX) 40 MG tablet TAKE 1 TABLET BY MOUTH DAILY (Patient taking differently: Take 40 mg by mouth 2 (two) times a day.)    gabapentin (NEURONTIN) 300 MG capsule Take 300 mg by mouth 2 (two) times daily.    ketoconazole (NIZORAL) 2 % cream Apply topically 2 (two) times daily. Apply  to rash twice a day     latanoprost 0.005 % ophthalmic solution     levothyroxine (SYNTHROID) 50 MCG tablet Take 50 mcg by mouth before breakfast.    losartan (COZAAR) 100 MG tablet Take 1 tablet (100 mg total) by mouth once daily. (Patient not taking: Reported on 11/20/2022)    PATANOL 0.1 % ophthalmic solution     potassium chloride (KLOR-CON) 20 mEq Pack Take 20 mEq by mouth 2 (two) times daily.    tadalafiL (CIALIS) 10 MG tablet Take 10 mg by mouth daily as needed for Erectile Dysfunction.    tizanidine 2 mg Cap Take 2 mg by mouth 3 (three) times daily.    tramadol (ULTRAM) 50 mg tablet Take 50 mg by mouth every 6 (six) hours as needed for Pain.    trazodone (DESYREL) 50 MG tablet TK 1 T PO QHS     Family History       Problem Relation (Age of Onset)    Cancer Mother, Father    Heart disease Brother          Tobacco Use    Smoking status: Former    Smokeless tobacco: Never   Substance and Sexual Activity    Alcohol use: Yes     Comment: occasional    Drug use: No    Sexual activity: Not on file     Review of Systems   Constitutional:  Positive for appetite change, fatigue and unexpected weight change. Negative for chills and fever.   HENT:  Negative for ear discharge and ear pain.    Eyes:  Negative for pain and itching.   Respiratory:  Positive for cough and shortness of breath. Negative for apnea and wheezing.    Cardiovascular:  Positive for leg swelling. Negative for chest pain.   Gastrointestinal:  Positive for diarrhea. Negative for constipation.   Endocrine: Negative for polydipsia and polyphagia.   Genitourinary:  Positive for decreased urine volume. Negative for flank pain.   Skin:  Positive for wound. Negative for rash.   Allergic/Immunologic: Positive for immunocompromised state.   Neurological:  Negative for seizures and numbness.   Hematological:  Does not bruise/bleed easily.   Psychiatric/Behavioral:  Negative for agitation and confusion.    Objective:     Vital Signs (Most Recent):  Temp: 98.4 °F (36.9 °C)  (11/20/22 0407)  Pulse: 108 (11/20/22 0407)  Resp: 20 (11/20/22 0407)  BP: (!) 142/77 (11/20/22 0407)  SpO2: 95 % (11/20/22 0407)   Vital Signs (24h Range):  Temp:  [98 °F (36.7 °C)-98.6 °F (37 °C)] 98.4 °F (36.9 °C)  Pulse:  [] 108  Resp:  [14-24] 20  SpO2:  [95 %-98 %] 95 %  BP: (123-142)/(68-84) 142/77     Weight: 87.6 kg (193 lb 2 oz)  Body mass index is 27.71 kg/m².    Physical Exam  Vitals and nursing note reviewed.   Constitutional:       Appearance: Normal appearance. He is obese.   HENT:      Head: Normocephalic and atraumatic.   Eyes:      Extraocular Movements: Extraocular movements intact.      Pupils: Pupils are equal, round, and reactive to light.   Cardiovascular:      Rate and Rhythm: Tachycardia present. Rhythm irregular.   Abdominal:      General: Abdomen is flat. Bowel sounds are normal.      Palpations: Abdomen is soft.      Comments: Ventral hernia present and no pain    Musculoskeletal:         General: Normal range of motion.      Cervical back: Normal range of motion.      Right lower leg: Edema present.      Left lower leg: Edema present.      Comments: Brandon pitting edema 3+   Skin:     General: Skin is warm and dry.      Comments: Coccyx pressure area   Neurological:      General: No focal deficit present.      Mental Status: He is alert and oriented to person, place, and time. Mental status is at baseline.      Comments: Incomplete quad at baseline         CRANIAL NERVES     CN III, IV, VI   Pupils are equal, round, and reactive to light.     Significant Labs: All pertinent labs within the past 24 hours have been reviewed.  Recent Lab Results         11/20/22  0005   11/19/22  2246        Albumin   3.6       Alkaline Phosphatase   56       ALT   15       Anion Gap   6       Appearance, UA Clear         AST   11       Baso #   0.03       Basophil %   0.4       Bilirubin (UA) Negative         BILIRUBIN TOTAL   0.3  Comment: For infants and newborns, interpretation of results should be  based  on gestational age, weight and in agreement with clinical  observations.    Premature Infant recommended reference ranges:  Up to 24 hours.............<8.0 mg/dL  Up to 48 hours............<12.0 mg/dL  3-5 days..................<15.0 mg/dL  6-29 days.................<15.0 mg/dL         BNP   233  Comment: Values of less than 100 pg/ml are consistent with non-CHF populations.       BUN   14       Calcium   8.6       Chloride   99       CO2   35       Color, UA Yellow         Creatinine   0.8       D-Dimer   0.63  Comment: The quantitative D-dimer assay should be used as an aid in   the diagnosis of deep vein thrombosis and pulmonary embolism  in patients with the appropriate presentation and clinical  history. The upper limit of the reference interval and the clinical   cut off   point are identical. Causes of a positive (>0.50 mg/L FEU) D-Dimer   test  include, but are not limited to: DVT, PE, DIC, thrombolytic   therapy, anticoagulant therapy, recent surgery, trauma, or   pregnancy, disseminated malignancy, aortic aneurysm, cirrhosis,  and severe infection. False negative results may occur in   patients with distal DVT.         Differential Method   Automated       eGFR   >60       Eos #   0.1       Eosinophil %   1.3       Glucose   144       Glucose, UA Negative         Gran # (ANC)   4.6       Gran %   61.4       Hematocrit   41.4       Hemoglobin   13.4       Immature Grans (Abs)   0.03  Comment: Mild elevation in immature granulocytes is non specific and   can be seen in a variety of conditions including stress response,   acute inflammation, trauma and pregnancy. Correlation with other   laboratory and clinical findings is essential.         Immature Granulocytes   0.4       Ketones, UA Negative         Leukocytes, UA Negative         Lymph #   2.1       Lymph %   27.2       Magnesium   1.9       MCH   30.4       MCHC   32.4       MCV   94       Mono #   0.7       Mono %   9.3       MPV   10.4        NITRITE UA Negative         nRBC   0       Occult Blood UA Negative         pH, UA 6.0         Platelets   152       Potassium   4.3       PROTEIN TOTAL   6.5       Protein, UA Negative  Comment: Recommend a 24 hour urine protein or a urine   protein/creatinine ratio if globulin induced proteinuria is  clinically suspected.           RBC   4.41       RDW   12.8       Sodium   140       Specific [place], UA 1.015         Specimen UA Urine, Clean Catch         Troponin I   0.007  Comment: The reference interval for Troponin I represents the 99th percentile   cutoff   for our facility and is consistent with 3rd generation assay   performance.         UROBILINOGEN UA Negative         WBC   7.54               Significant Imaging: I have reviewed all pertinent imaging results/findings within the past 24 hours.    Assessment/Plan:     * Fluid overload, unspecified  78 y/o male with 3 week hx of diarrhea, SOB and leg swelling, CXR fluid overload    - admit to OBS  - Lasix 40 mg IV BID and transition back to PO  - daily weight  - accurate I&O's  - repeat CXR  - elevate legs  - 2 d echo   - serial labs to monitor  - telemetry          Diarrhea  Stool culture  Has Ambulatory referral to GI already  Abdominal x-ray    COPD (chronic obstructive pulmonary disease)  On home O2       Hyperlipidemia  Lipitor 40 mg daily      Atrial fibrillation  Patient with Permanent atrial fibrillation which is uncontrolled currently with Pacerone. Patient is currently in atrial fibrillation.[id number] Score: 2. HASBLED Score: 2. Anticoagulation not on anticoagulation by ASA 81 mg daily    Not sure why no anticoagulation but onl;y on ASA 81 mg daily  Continue Pacerone  telemetry        HTN (hypertension)  Normotensive  Home meds        VTE Risk Mitigation (From admission, onward)         Ordered     enoxaparin injection 40 mg  Daily         11/20/22 0345     IP VTE HIGH RISK PATIENT  Once         11/20/22 0345     Place sequential compression  device  Until discontinued         11/20/22 0345                   Keri Alves NP  Department of Hospital Medicine   [facility]

## 2022-11-20 NOTE — ASSESSMENT & PLAN NOTE
Patient with Permanent atrial fibrillation which is uncontrolled currently with Pacerone. Patient is currently in atrial fibrillation.[id number] Score: 2. HASBLED Score: 2. Anticoagulation not on anticoagulation by ASA 81 mg daily    Not sure why no anticoagulation but onl;y on ASA 81 mg daily  Continue Pacerone  telemetry

## 2022-11-20 NOTE — ASSESSMENT & PLAN NOTE
78 y/o male with 3 week hx of diarrhea, SOB and leg swelling, CXR fluid overload    - admit to OBS  - Lasix 40 mg IV BID and transition back to PO  - daily weight  - accurate I&O's  - repeat CXR  - elevate legs  - 2 d echo   - serial labs to monitor  - telemetry

## 2022-11-20 NOTE — NURSING
Pt arrived on unit via stretcher, awake alert and oriented. IV site noted; saline lock. Pt on 1L O2 NC; no distress. Skin assessed; stage 2 noted, mepilex changed and intact. Vitals assessed. Pt is incont. Swelling noted to BLE. Safety measures maintained. Call light within reach. Bed alarm set. Will cont to monitor

## 2022-11-20 NOTE — CARE UPDATE
Patient has been seen and examintaed,patient with Hx of quraiplegia,WC bound,HTN,hyperlipoidemia,COPD,has eebn placed for observation for fluid overload with elevated BNP,has bisi started on IV lasix,and echo. Is pending,feel much better  today,swelling is improved,stable on NC O 2.

## 2022-11-20 NOTE — SUBJECTIVE & OBJECTIVE
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     Current Outpatient Medications on File Prior to Encounter   Medication Sig    amlodipine (NORVASC) 5 MG tablet TAKE 1 TABLET BY MOUTH DAILY    levocetirizine (XYZAL) 5 MG tablet Take 5 mg by mouth every evening.    pantoprazole (PROTONIX) 40 MG tablet Take 40 mg by mouth once daily.    tamsulosin (FLOMAX) 0.4 mg Cp24 Take 1 tablet by mouth once daily.    allopurinol (ZYLOPRIM) 300 MG tablet Take by mouth. 1 Tablet Oral Every day    amiodarone (PACERONE) 200 MG Tab TAKE 1 TABLET BY MOUTH DAILY (Patient not taking: Reported on 11/19/2022)    aspirin (ECOTRIN) 81 MG EC tablet Take 81 mg by mouth once daily.    azelastine (ASTELIN) 137 mcg nasal spray once as needed. 1 Aerosol, Spray Nasal    baclofen (LIORESAL) 10 MG tablet Take 10 mg by mouth 4 (four) times daily as needed.    carvedilol (COREG) 3.125 MG tablet TAKE 1 TABLET BY MOUTH TWICE DAILY WITH A MEAL (Patient taking differently: Take 6.25 mg by mouth 2 (two) times daily.)    cetirizine (ZYRTEC) 5 MG chewable tablet Take 5 mg by mouth once as needed for Allergies.    CRESTOR 10 mg tablet TAKE ONE TABLET BY MOUTH DAILY    dextroamphetamine-amphetamine (ADDERALL) 20 mg tablet TK 1 T PO D    dorzolamide (TRUSOPT) 2 % ophthalmic solution     ergocalciferol (ERGOCALCIFEROL) 50,000 unit Cap Take 50,000 Units by mouth every 7 days.    finasteride (PROSCAR) 5 mg tablet Take 5 mg by  mouth every evening.    fluorometholone 0.1% (FML) 0.1 % DrpS     fluticasone (FLONASE) 50 mcg/actuation nasal spray Inhale 1 spray into the lungs Daily.    furosemide (LASIX) 40 MG tablet TAKE 1 TABLET BY MOUTH DAILY (Patient taking differently: Take 40 mg by mouth 2 (two) times a day.)    gabapentin (NEURONTIN) 300 MG capsule Take 300 mg by mouth 2 (two) times daily.    ketoconazole (NIZORAL) 2 % cream Apply topically 2 (two) times daily. Apply  to rash twice a day    latanoprost 0.005 % ophthalmic solution     levothyroxine (SYNTHROID) 50 MCG tablet Take 50 mcg by mouth before breakfast.    losartan (COZAAR) 100 MG tablet Take 1 tablet (100 mg total) by mouth once daily. (Patient not taking: Reported on 11/20/2022)    PATANOL 0.1 % ophthalmic solution     potassium chloride (KLOR-CON) 20 mEq Pack Take 20 mEq by mouth 2 (two) times daily.    tadalafiL (CIALIS) 10 MG tablet Take 10 mg by mouth daily as needed for Erectile Dysfunction.    tizanidine 2 mg Cap Take 2 mg by mouth 3 (three) times daily.    tramadol (ULTRAM) 50 mg tablet Take 50 mg by mouth every 6 (six) hours as needed for Pain.    trazodone (DESYREL) 50 MG tablet TK 1 T PO QHS     Family History       Problem Relation (Age of Onset)    Cancer Mother, Father    Heart disease Brother          Tobacco Use    Smoking status: Former    Smokeless tobacco: Never   Substance and Sexual Activity    Alcohol use: Yes     Comment: occasional    Drug use: No    Sexual activity: Not on file     Review of Systems   Constitutional:  Positive for appetite change, fatigue and unexpected weight change. Negative for chills and fever.   HENT:  Negative for ear discharge and ear pain.    Eyes:  Negative for pain and itching.   Respiratory:  Positive for cough and shortness of breath. Negative for apnea and wheezing.    Cardiovascular:  Positive for leg swelling. Negative for chest pain.   Gastrointestinal:  Positive for diarrhea. Negative for constipation.   Endocrine:  Negative for polydipsia and polyphagia.   Genitourinary:  Positive for decreased urine volume. Negative for flank pain.   Skin:  Positive for wound. Negative for rash.   Allergic/Immunologic: Positive for immunocompromised state.   Neurological:  Negative for seizures and numbness.   Hematological:  Does not bruise/bleed easily.   Psychiatric/Behavioral:  Negative for agitation and confusion.    Objective:     Vital Signs (Most Recent):  Temp: 98.4 °F (36.9 °C) (11/20/22 0407)  Pulse: 108 (11/20/22 0407)  Resp: 20 (11/20/22 0407)  BP: (!) 142/77 (11/20/22 0407)  SpO2: 95 % (11/20/22 0407)   Vital Signs (24h Range):  Temp:  [98 °F (36.7 °C)-98.6 °F (37 °C)] 98.4 °F (36.9 °C)  Pulse:  [] 108  Resp:  [14-24] 20  SpO2:  [95 %-98 %] 95 %  BP: (123-142)/(68-84) 142/77     Weight: 87.6 kg (193 lb 2 oz)  Body mass index is 27.71 kg/m².    Physical Exam  Vitals and nursing note reviewed.   Constitutional:       Appearance: Normal appearance. He is obese.   HENT:      Head: Normocephalic and atraumatic.   Eyes:      Extraocular Movements: Extraocular movements intact.      Pupils: Pupils are equal, round, and reactive to light.   Cardiovascular:      Rate and Rhythm: Tachycardia present. Rhythm irregular.   Abdominal:      General: Abdomen is flat. Bowel sounds are normal.      Palpations: Abdomen is soft.      Comments: Ventral hernia present and no pain    Musculoskeletal:         General: Normal range of motion.      Cervical back: Normal range of motion.      Right lower leg: Edema present.      Left lower leg: Edema present.      Comments: Brandon pitting edema 3+   Skin:     General: Skin is warm and dry.      Comments: Coccyx pressure area   Neurological:      General: No focal deficit present.      Mental Status: He is alert and oriented to person, place, and time. Mental status is at baseline.      Comments: Incomplete quad at baseline         CRANIAL NERVES     CN III, IV, VI   Pupils are equal, round, and  reactive to light.     Significant Labs: All pertinent labs within the past 24 hours have been reviewed.  Recent Lab Results         11/20/22  0005   11/19/22  2246        Albumin   3.6       Alkaline Phosphatase   56       ALT   15       Anion Gap   6       Appearance, UA Clear         AST   11       Baso #   0.03       Basophil %   0.4       Bilirubin (UA) Negative         BILIRUBIN TOTAL   0.3  Comment: For infants and newborns, interpretation of results should be based  on gestational age, weight and in agreement with clinical  observations.    Premature Infant recommended reference ranges:  Up to 24 hours.............<8.0 mg/dL  Up to 48 hours............<12.0 mg/dL  3-5 days..................<15.0 mg/dL  6-29 days.................<15.0 mg/dL         BNP   233  Comment: Values of less than 100 pg/ml are consistent with non-CHF populations.       BUN   14       Calcium   8.6       Chloride   99       CO2   35       Color, UA Yellow         Creatinine   0.8       D-Dimer   0.63  Comment: The quantitative D-dimer assay should be used as an aid in   the diagnosis of deep vein thrombosis and pulmonary embolism  in patients with the appropriate presentation and clinical  history. The upper limit of the reference interval and the clinical   cut off   point are identical. Causes of a positive (>0.50 mg/L FEU) D-Dimer   test  include, but are not limited to: DVT, PE, DIC, thrombolytic   therapy, anticoagulant therapy, recent surgery, trauma, or   pregnancy, disseminated malignancy, aortic aneurysm, cirrhosis,  and severe infection. False negative results may occur in   patients with distal DVT.         Differential Method   Automated       eGFR   >60       Eos #   0.1       Eosinophil %   1.3       Glucose   144       Glucose, UA Negative         Gran # (ANC)   4.6       Gran %   61.4       Hematocrit   41.4       Hemoglobin   13.4       Immature Grans (Abs)   0.03  Comment: Mild elevation in immature granulocytes is  non specific and   can be seen in a variety of conditions including stress response,   acute inflammation, trauma and pregnancy. Correlation with other   laboratory and clinical findings is essential.         Immature Granulocytes   0.4       Ketones, UA Negative         Leukocytes, UA Negative         Lymph #   2.1       Lymph %   27.2       Magnesium   1.9       MCH   30.4       MCHC   32.4       MCV   94       Mono #   0.7       Mono %   9.3       MPV   10.4       NITRITE UA Negative         nRBC   0       Occult Blood UA Negative         pH, UA 6.0         Platelets   152       Potassium   4.3       PROTEIN TOTAL   6.5       Protein, UA Negative  Comment: Recommend a 24 hour urine protein or a urine   protein/creatinine ratio if globulin induced proteinuria is  clinically suspected.           RBC   4.41       RDW   12.8       Sodium   140       Specific [place], UA 1.015         Specimen UA Urine, Clean Catch         Troponin I   0.007  Comment: The reference interval for Troponin I represents the 99th percentile   cutoff   for our facility and is consistent with 3rd generation assay   performance.         UROBILINOGEN UA Negative         WBC   7.54               Significant Imaging: I have reviewed all pertinent imaging results/findings within the past 24 hours.

## 2022-11-20 NOTE — NURSING
At 1400 pts bp was 109/55. Hydralazine 50mg held. At 1600 pts bp was 117/52. Messaged Dr. Martinez. Ordered to hold.

## 2022-11-20 NOTE — PT/OT/SLP EVAL
Occupational Therapy   Evaluation    Name: Humberto Lopez  MRN: [id number]  Admitting Diagnosis:  Fluid overload, unspecified  Recent Surgery: * No surgery found *      Recommendations:     Discharge Recommendations: home health OT (with caregiver assistance)  Discharge Equipment Recommendations:  none  Barriers to discharge:  None    Assessment:     Humberto Lopez is a 77 y.o. male with a medical diagnosis of Fluid overload, unspecified. Performance deficits affecting function: weakness, decreased upper extremity function, decreased ROM, impaired cardiopulmonary response to activity, impaired endurance, impaired balance, decreased lower extremity function, impaired fine motor, impaired self care skills, impaired functional mobility, decreased coordination, decreased safety awareness.      CGA scoot pivot bed>personal power chair on 1L O2. Pt c/o SOB- spO2 94-95%. Pt required MIN A power chair>bed. Pt set-up with lunch using LUE.    Pt reports that he was supposed to start outpatient OT tomorrow, but it has been difficult arranging public transportation/help by grandchildren.     Rehab Prognosis: Good; patient would benefit from acute skilled OT services to address these deficits and reach maximum level of function.       Plan:     Patient to be seen 5 x/week to address the above listed problems via self-care/home management, therapeutic activities, therapeutic exercises  Plan of Care Expires: 12/04/22  Plan of Care Reviewed with: patient    Subjective     Chief Complaint: got a little SOB with power chair transfer   Patient/Family Comments/goals: agreeable to session     Occupational Profile:  Living Environment: pt lives alone in a [facility] [place] with ramp at entry.   Previous level of function: pt is MOD I with bed mobility and t/f to BSC/power chair/ w/c via squat pivot; pt uses slide board for car transfer. Pt uses manual w/c in the home and power chair in the community. MOD I with most ADLs; aide comes over 3x/week to  assist with showers, home maintenance, meals etc. No falls reported in the last 6 months. Pt reports being in a w/c since 2017 and does not stand. Pt has a splint to wear to RUE, but has not been compliant with use. Pt wears 2.5L O2 at home most of the time. Pt reports that he was supposed to start OP OT tomorrow, but it has been difficult with arranging transportation.    Roles and Routines: likes to watch football   Equipment Used at Home:  oxygen, hospital bed, grab bar, slide board, wheelchair, power chair, bedside commode  Assistance upon Discharge: goddaughter, grandsons, aide 3x/week    Pain/Comfort:  Pain Rating 1: 0/10    Patients cultural, spiritual, Gnosticism conflicts given the current situation: no    Objective:     Communicated with: nurseChe, prior to session.  Patient found HOB elevated with bed alarm, Condom Catheter, telemetry, peripheral IV, oxygen upon OT entry to room.    General Precautions: Standard, fall, respiratory   Orthopedic Precautions:N/A   Braces: N/A  Respiratory Status: Nasal cannula, flow 1 L/min, spO2 95%, 99 bpm seated EOB     Occupational Performance:    Bed Mobility:    Patient completed Rolling/Turning to Left with  stand by assistance and with side rail  Patient completed Rolling/Turning to Right with stand by assistance and with side rail  Patient completed Scooting anteriorly with stand by assistance  Patient completed Supine to Sit with stand by assistance, with side rail, and HOB elevated  Patient completed Sit to Supine with minimum assistance for BLE    Functional Mobility/Transfers:  Patient completed Sit <> Stand Transfer with minimum assistance  with  rolling walker   Patient completed Bed > Power chair Transfer using Scoot Pivot technique with contact guard assistance with no assistive device; power chair>bed with minimal assistance   Functional Mobility: Gait belt donned prior to transfer for safety during mobility/transfers. Pt practiced x5 partial sit<>stand  with use of BUE on side rail and the bed with SBA. Pt then completed partial stand with use of RW with MIN A. Pt MOD I with set-up of power chair to position by bed for scoot transfer.     Activities of Daily Living:  Feeding:  minimum assistance to cut up meatloaf at end of session   Upper Body Dressing: minimum assistance for gown   Lower Body Dressing: pt rolling L<>R with SBA with CNA present to change brief upon OT arrival      Cognitive/Visual Perceptual:  Cognitive/Psychosocial Skills:     -       Follows Commands/attention:Follows multistep  commands  -       Communication: clear/fluent  -       Memory: No Deficits noted  -       Safety awareness/insight to disability: intact   -       Mood/Affect/Coping skills/emotional control: Appropriate to situation  Visual/Perceptual:      -Intact  R/L discrimination      Physical Exam:  Balance:    -       seated: SBA; standing: MIN A with RW   Postural examination/scapula alignment:    -       Rounded shoulders  -       Forward head  Skin integrity: Visible skin intact  Edema:  no BUE edema noted; mild BLE edema   Sensation:    -       Intact  light/touch BUE; pt reports chronic tingling to BUE since 2017   Dominant hand:    -       R; however, pt uses LUE since 2017   Upper Extremity Range of Motion:     -       Right Upper Extremity: digits III-V contracted (MCP flexion, PIP flexion, DIP flexion- minimal range able to stretch); forearm supination to neutral, elbow flexion AROM partial range, shoulder flexion AROM less than partial range   -       Left Upper Extremity: WFL  Upper Extremity Strength:    -       Right Upper Extremity: WFL  -       Left Upper Extremity: WFL   Strength:    -       Right Upper Extremity: WFL  -       Left Upper Extremity: WFL  Fine Motor Coordination:    -       Intact  Left hand, manipulation of objects  -       Impaired  Right hand, manipulation of objects    Gross motor coordination:   mildly impaired     AMPA 6 Click ADL:  [facility]  Total Score: 17    Treatment & Education:  Pt educated on OT role/POC.   Importance of OOB activity with staff assistance.  Safety during functional t/f and mobility   Cueing by OT for pt with bed mobility while CNA assisted pt with sacral dressing and brief change to promote increased pt participation with care and less burden on CNA   Multiple self-care tasks/functional mobility completed- assistance level noted above   Edu with cueing and practice of pursed lip breathing with functional transfer; pt assisted back to bed after t/f practice to elevate BLE 2* mild edema   All questions/concerns answered within OT scope of practice       Patient left  bed in chair position with RUE elevated on pillow with B/L pressure relief boots on and tray table in front of pt  with all lines intact, call button in reach, bed alarm on, nurse, Che, and CNA, Deedee, notified, and all needs met/within reach; door cracked open    GOALS:   Multidisciplinary Problems       Occupational Therapy Goals          Problem: Occupational Therapy    Goal Priority Disciplines Outcome Interventions   Occupational Therapy Goal     OT, PT/OT Ongoing, Progressing    Description: Goals to be met by: 12/04/22     Patient will increase functional independence with ADLs by performing:    Feeding with Modified [place].  UE Dressing with Modified [place].  LE Dressing with Modified [place].  Grooming while seated with Modified [place].  Toileting from toilet with Modified [place] for hygiene and clothing management.   Supine to sit with Modified [place].  Squat pivot transfers with Supervision.  Toilet transfer to bedside commode with Supervision.  Upper extremity exercise program x15 reps per handout, with assistance as needed.                         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

## 2022-11-20 NOTE — HPI
76 y/o male who has been having diarrhea for the past 3 weeks with 3-4 BM's a day  and SOB for the past 2-3 weeks, plus leg swelling. He was seen by jis PCP Dr ANNALISE Coyne on 11-18-22 and given RX for lomtil with orders for labs and referral to ambulatory GI.   He presents to the the ED at [facility] 0n 11-19-22 with same c/s's. Also c/o trouble sleeping, lack of appetite, and decrease in urine    Patient is incomplete quad and is w/c level ambulation so legs are dependant all the time.     ED w/u reveals CXR possible fluid overload so was given Lasix 80 mg IV, , UA normal, glucose 144, d-dimer 0.63 but age adjusted OK, EKG afib    Patient is being admitted to Hospital medicine OBS for fluid overload and diuresing.

## 2022-11-20 NOTE — ED PROVIDER NOTES
"Encounter Date: 11/19/2022    SCRIBE #1 NOTE: Vashti MOLINA, promise scribing for, and in the presence of,  Augusta Bai MD. Other sections scribed: MELONY MOSCOSO.   SCRIBE #2 NOTE: David MOLINA, promise scribing for, and in the presence of,  Augusta Bai MD. I have scribed the remaining portions of the note not scribed by Scribe #1.   History     Chief Complaint   Patient presents with    Cough     Presents with complaint of nonproductive cough x 3 weeks    Diarrhea     Also complaining of diarrhea and weakness x 1 month, Seen at PCP yesterday for complaint of back pain     Humberto Lopez is a 77 y.o. male, with a past medical history of COPD, A-fib, HTN, HLD, and insertion of pacemaker, who presents to the ED with complaints of diarrhea beginning 3 weeks. Pt reports ongoing complaints of diarrhea for 3 weeks, endorsing 3-4 bowel movements per day. Patient further reports decreased appetite, trouble sleeping, shortness of breath, and a dry cough for 3 weeks. The patient also reports swelling to his bilateral lower extremities and decreased urine. The patient endorses he recently saw his PCP yesterday for his diarrhea and was prescribed medication, but notes "he is unsure of what it is." Compliant with Lasix, and daily ASA. No other medications taken PTA. No other exacerbating or alleviating factors. Patient denies chest pain, abdominal pain, fever, chills, blood in stool, or other associated symptoms. Patient denies any recent compliance with antibiotics, travel, or changes in his diet. No known allergies.     The history is provided by the patient. No  was used.   Review of patient's allergies indicates:   Allergen Reactions    No known drug allergies      Past Medical History:   Diagnosis Date    *Atrial fibrillation     Hyperlipidemia     Hypertension     Pacemaker     Positive D dimer      Past Surgical History:   Procedure Laterality Date    A-V CARDIAC PACEMAKER INSERTION      " APPENDECTOMY      CARPAL TUNNEL RELEASE      ELBOW SURGERY      TONSILLECTOMY       Family History   Problem Relation Age of Onset    Cancer Mother     Cancer Father     Heart disease Brother      Social History     Tobacco Use    Smoking status: Former    Smokeless tobacco: Never   Substance Use Topics    Alcohol use: Yes     Comment: occasional    Drug use: No     Review of Systems   Constitutional:  Positive for appetite change. Negative for chills and fever.   HENT:  Negative for congestion and sore throat.    Eyes:  Negative for visual disturbance.   Respiratory:  Positive for cough (dry) and shortness of breath.    Cardiovascular:  Positive for leg swelling (BLE). Negative for chest pain.   Gastrointestinal:  Positive for diarrhea. Negative for abdominal pain, blood in stool, nausea and vomiting.   Endocrine: Negative for polyuria.   Genitourinary:  Positive for decreased urine volume. Negative for dysuria and hematuria.   Musculoskeletal:  Negative for gait problem.   Skin:  Negative for rash.   Allergic/Immunologic: Negative for immunocompromised state.   Neurological:  Negative for weakness and headaches.   Psychiatric/Behavioral:  Positive for sleep disturbance.      Physical Exam     Initial Vitals [11/19/22 2213]   BP Pulse Resp Temp SpO2   123/75 98 19 98.4 °F (36.9 °C) 95 %      MAP       --         Physical Exam    Nursing note and vitals reviewed.  Constitutional: He appears well-developed and well-nourished. He is not diaphoretic. No distress.   HENT:   Head: Normocephalic and atraumatic.   Eyes: Conjunctivae and EOM are normal. Pupils are equal, round, and reactive to light.   Neck: No JVD present.   Normal range of motion.  Cardiovascular:  Normal rate and regular rhythm.           Pulses:       Radial pulses are 2+ on the right side and 2+ on the left side.        Posterior tibial pulses are 2+ on the right side and 2+ on the left side.   Pulmonary/Chest: Breath sounds normal. No respiratory  distress.   Abdominal: Abdomen is soft. Bowel sounds are normal. He exhibits no distension. There is no abdominal tenderness. A hernia is present. Hernia confirmed positive in the ventral area. There is no rebound and no guarding.   Musculoskeletal:         General: No tenderness. Normal range of motion.      Cervical back: Normal range of motion.      Right lower leg: 3+ Pitting Edema present.      Left lower leg: 3+ Pitting Edema present.     Lymphadenopathy:     He has no cervical adenopathy.   Neurological: He is alert and oriented to person, place, and time.   Skin: Skin is warm. Capillary refill takes less than 2 seconds.   Stage 1 decubitus ulcer in lower back noted.   Psychiatric: He has a normal mood and affect.       ED Course   Procedures  Labs Reviewed   CBC W/ AUTO DIFFERENTIAL - Abnormal; Notable for the following components:       Result Value    RBC 4.41 (*)     Hemoglobin 13.4 (*)     All other components within normal limits   COMPREHENSIVE METABOLIC PANEL - Abnormal; Notable for the following components:    CO2 35 (*)     Glucose 144 (*)     Calcium 8.6 (*)     Anion Gap 6 (*)     All other components within normal limits   B-TYPE NATRIURETIC PEPTIDE - Abnormal; Notable for the following components:     (*)     All other components within normal limits   D DIMER, QUANTITATIVE - Abnormal; Notable for the following components:    D-Dimer 0.63 (*)     All other components within normal limits   URINALYSIS, REFLEX TO URINE CULTURE    Narrative:     Specimen Source->Urine   TROPONIN I   MAGNESIUM          Imaging Results              X-Ray Chest AP Portable (Final result)  Result time 11/19/22 23:12:52      Final result by Letitia Heck MD (11/19/22 23:12:52)                   Impression:      Please see above.      Electronically signed by: Letitia Heck MD  Date:    11/19/2022  Time:    23:12               Narrative:    EXAMINATION:  XR CHEST AP PORTABLE    CLINICAL HISTORY:  Cough,  unspecified    TECHNIQUE:  Single frontal view of the chest was performed.    COMPARISON:  None    FINDINGS:  Cardiac monitoring leads overlie the chest.  There is a left-sided cardiac pacing device in place.  Mediastinal structures are midline.  The cardiomediastinal silhouette is within normal limits of size and configuration allowing for accentuation by portable AP technique.  There is atherosclerosis of the thoracic aorta.  There are low lung volumes with increased interstitial prominence bilaterally.  Findings could relate to crowding of pulmonary bronchovascular markings due to hypoventilatory status with superimposed component of interstitial edema or infectious process difficult to definitively exclude.  No evidence of substantial volume of pleural fluid or pneumothorax.  Osseous structures are intact with degenerative change.                                       Medications   furosemide injection 80 mg (80 mg Intravenous Given 11/19/22 1630)     Medical Decision Making:   History:   Old Medical Records: I decided to obtain old medical records.  Initial Assessment:   77 y.o. male, with a past medical history of COPD, A-fib, HTN, HLD, and insertion of pacemaker, who presents to the ED with complaints SOB , cough and diarrhea. This patient presents with signs and symptoms consistent with volume overload , likely due to HF. Differential diagnosis includes alternate cardiopulmonary causes such as ischemia, PE, pneumothorax, and pneumonia, as well as other causes of dyspnea such as asthma/RAD, COPD, flash pulmonary edema, dysrhythmia but these are less likely. Patient is generally hemodynamically stable.    Plan: labs, EKG, CXR, troponin, intravenous diuresis, and electrolyte repletion. Will require admission for IV diuretics and medical optimization.    Independently Interpreted Test(s):   I have ordered and independently interpreted EKG Reading(s) - see prior notes  Clinical Tests:   Lab Tests: Ordered and  Reviewed  Radiological Study: Ordered and Reviewed  Medical Tests: Ordered and Reviewed        Scribe Attestation:   Scribe #1: I performed the above scribed service and the documentation accurately describes the services I performed. I attest to the accuracy of the note.  Scribe #2: I performed the above scribed service and the documentation accurately describes the services I performed. I attest to the accuracy of the note.      ED Course as of 11/20/22 1808   Sat Nov 19, 2022   2243 EKG at 10:31 p.m. AFib with RVR with ventricular rate of 102 no sign of ischemia.  BNP elevated to 233, troponin within normal limits.  D-dimer of 0.63 which is normal with age adjustment.  UA without sign of infection.  CBC without any leukocytosis or acute anemia.  Chest x-ray with signs of volume overload.  Given patient's bilateral lower extremity swelling elevated BNP reports of shortness of breath with cough concern for possible decompensated heart failure.  Will give 80 of IV Lasix and admit for further diuresis and workup. [AS]      ED Course User Index  [AS] Augusta Bai MD                 Clinical Impression:   Final diagnoses:  [R05.9] Cough  [E87.70] Volume overload        ED Disposition Condition    Observation         I, Augusta Bai MD, personally performed the services described in this documentation. All medical record entries made by the scribe were at my direction and in my presence. I have reviewed the chart and agree that the record reflects my personal performance and is accurate and complete.     This dictation has been generated using M-Modal Fluency Direct dictation; some phonetic errors may occur.           Augusta Bai MD  11/20/22 1809

## 2022-11-21 VITALS
SYSTOLIC BLOOD PRESSURE: 110 MMHG | BODY MASS INDEX: 29.76 KG/M2 | DIASTOLIC BLOOD PRESSURE: 62 MMHG | TEMPERATURE: 98 F | OXYGEN SATURATION: 94 % | RESPIRATION RATE: 20 BRPM | HEIGHT: 70 IN | HEART RATE: 93 BPM | WEIGHT: 207.88 LBS

## 2022-11-21 LAB
ALBUMIN SERPL BCP-MCNC: 3.5 G/DL (ref 3.5–5.2)
ALP SERPL-CCNC: 52 U/L (ref 55–135)
ALT SERPL W/O P-5'-P-CCNC: 17 U/L (ref 10–44)
ANION GAP SERPL CALC-SCNC: 12 MMOL/L (ref 8–16)
ANION GAP SERPL CALC-SCNC: 9 MMOL/L (ref 8–16)
AST SERPL-CCNC: 15 U/L (ref 10–40)
BILIRUB SERPL-MCNC: 0.6 MG/DL (ref 0.1–1)
BUN SERPL-MCNC: 16 MG/DL (ref 8–23)
BUN SERPL-MCNC: 16 MG/DL (ref 8–23)
CALCIUM SERPL-MCNC: 8.7 MG/DL (ref 8.7–10.5)
CALCIUM SERPL-MCNC: 8.9 MG/DL (ref 8.7–10.5)
CHLORIDE SERPL-SCNC: 95 MMOL/L (ref 95–110)
CHLORIDE SERPL-SCNC: 95 MMOL/L (ref 95–110)
CO2 SERPL-SCNC: 38 MMOL/L (ref 23–29)
CO2 SERPL-SCNC: 39 MMOL/L (ref 23–29)
CREAT SERPL-MCNC: 0.8 MG/DL (ref 0.5–1.4)
CREAT SERPL-MCNC: 0.8 MG/DL (ref 0.5–1.4)
EST. GFR  (NO RACE VARIABLE): >60 ML/MIN/1.73 M^2
EST. GFR  (NO RACE VARIABLE): >60 ML/MIN/1.73 M^2
GLUCOSE SERPL-MCNC: 97 MG/DL (ref 70–110)
GLUCOSE SERPL-MCNC: 98 MG/DL (ref 70–110)
MAGNESIUM SERPL-MCNC: 1.9 MG/DL (ref 1.6–2.6)
PHOSPHATE SERPL-MCNC: 4.6 MG/DL (ref 2.7–4.5)
POTASSIUM SERPL-SCNC: 4.3 MMOL/L (ref 3.5–5.1)
POTASSIUM SERPL-SCNC: 4.4 MMOL/L (ref 3.5–5.1)
PROT SERPL-MCNC: 6.4 G/DL (ref 6–8.4)
SODIUM SERPL-SCNC: 143 MMOL/L (ref 136–145)
SODIUM SERPL-SCNC: 145 MMOL/L (ref 136–145)

## 2022-11-21 PROCEDURE — 36415 COLL VENOUS BLD VENIPUNCTURE: CPT | Performed by: NURSE PRACTITIONER

## 2022-11-21 PROCEDURE — 97110 THERAPEUTIC EXERCISES: CPT

## 2022-11-21 PROCEDURE — 96376 TX/PRO/DX INJ SAME DRUG ADON: CPT

## 2022-11-21 PROCEDURE — 94761 N-INVAS EAR/PLS OXIMETRY MLT: CPT

## 2022-11-21 PROCEDURE — 25000003 PHARM REV CODE 250: Performed by: HOSPITALIST

## 2022-11-21 PROCEDURE — 27000221 HC OXYGEN, UP TO 24 HOURS

## 2022-11-21 PROCEDURE — 80048 BASIC METABOLIC PNL TOTAL CA: CPT | Mod: XB | Performed by: HOSPITALIST

## 2022-11-21 PROCEDURE — G0378 HOSPITAL OBSERVATION PER HR: HCPCS

## 2022-11-21 PROCEDURE — 84100 ASSAY OF PHOSPHORUS: CPT | Performed by: NURSE PRACTITIONER

## 2022-11-21 PROCEDURE — 63600175 PHARM REV CODE 636 W HCPCS: Performed by: HOSPITALIST

## 2022-11-21 PROCEDURE — 25000003 PHARM REV CODE 250: Performed by: NURSE PRACTITIONER

## 2022-11-21 PROCEDURE — 80053 COMPREHEN METABOLIC PANEL: CPT | Performed by: NURSE PRACTITIONER

## 2022-11-21 PROCEDURE — 83735 ASSAY OF MAGNESIUM: CPT | Performed by: NURSE PRACTITIONER

## 2022-11-21 RX ORDER — BACLOFEN 10 MG/1
10 TABLET ORAL 3 TIMES DAILY
Status: DISCONTINUED | OUTPATIENT
Start: 2022-11-21 | End: 2022-11-21 | Stop reason: HOSPADM

## 2022-11-21 RX ORDER — FUROSEMIDE 40 MG/1
40 TABLET ORAL DAILY
Qty: 90 TABLET | Refills: 0 | Status: SHIPPED | OUTPATIENT
Start: 2022-11-21

## 2022-11-21 RX ADMIN — ATORVASTATIN CALCIUM 40 MG: 40 TABLET, FILM COATED ORAL at 09:11

## 2022-11-21 RX ADMIN — FUROSEMIDE 20 MG: 10 INJECTION, SOLUTION INTRAMUSCULAR; INTRAVENOUS at 06:11

## 2022-11-21 RX ADMIN — LEVOTHYROXINE SODIUM 50 MCG: 50 TABLET ORAL at 05:11

## 2022-11-21 RX ADMIN — GABAPENTIN 300 MG: 300 CAPSULE ORAL at 09:11

## 2022-11-21 RX ADMIN — HYDRALAZINE HYDROCHLORIDE 50 MG: 25 TABLET, FILM COATED ORAL at 05:11

## 2022-11-21 RX ADMIN — ASPIRIN 81 MG: 81 TABLET, DELAYED RELEASE ORAL at 09:11

## 2022-11-21 RX ADMIN — CARVEDILOL 6.25 MG: 6.25 TABLET, FILM COATED ORAL at 09:11

## 2022-11-21 RX ADMIN — BACLOFEN 10 MG: 10 TABLET ORAL at 08:11

## 2022-11-21 RX ADMIN — AMIODARONE HYDROCHLORIDE 200 MG: 200 TABLET ORAL at 09:11

## 2022-11-21 NOTE — PLAN OF CARE
11/21/22 1111   Final Note   Assessment Type Final Discharge Note   Anticipated Discharge Disposition Home   Hospital Resources/Appts/Education Provided Appointments scheduled and added to AVS   Post-Acute Status   Post-Acute Authorization Home Health   Home Health Status Set-up Complete/Auth obtained   Pts nurse Caitie notified that the pt can d/c from CM standpoint

## 2022-11-21 NOTE — DISCHARGE SUMMARY
[facility] Medicine  Discharge Summary      Patient Name: Humberto Lopez  MRN: [id number]  MAXIMILIANO: [id number]  Patient Class: OP- Observation  Admission Date: 11/19/2022  Hospital Length of Stay: 2 days   Discharge Date and Time:  11/21/2022 9:51 AM  Attending Physician: Slick Moeller MD   Discharging Provider: Slick Moeller MD  Primary Care Provider: Wolf Coyne MD    Primary Care Team: Networked reference to record PCT     HPI:   78 y/o male who has been having diarrhea for the past 3 weeks with 3-4 BM's a day  and SOB for the past 2-3 weeks, plus leg swelling. He was seen by jis PCP Dr ANNALISE Coyne on 11-18-22 and given RX for lomtil with orders for labs and referral to ambulatory GI.   He presents to the the ED at [facility] 0n 11-19-22 with same c/s's. Also c/o trouble sleeping, lack of appetite, and decrease in urine    Patient is incomplete quad and is w/c level ambulation so legs are dependant all the time.     ED w/u reveals CXR possible fluid overload so was given Lasix 80 mg IV, , UA normal, glucose 144, d-dimer 0.63 but age adjusted OK, EKG afib    Patient is being admitted to Hospital medicine OBS for fluid overload and diuresing.          * No surgery found *      Hospital Course:   patient with Hx of quraiplegia,WC bound,HTN,hyperlipoidemia,COPD,has been  placed for observation for fluid overload with elevated BNP,was started on IV lasix,and echo.show some pulmonary HTN,feel much better after diuresis,,swelling is improved,was stable on NC O 2.patient did well with PT,OT and HH at CT time is arranged,patient was discharged home with lasix and follow up with PCP as out patient.       Goals of Care Treatment Preferences:  Code Status: Full Code      Consults:   Consults (From admission, onward)        Status Ordering Provider     Inpatient consult to Registered Dietitian/Nutritionist  Once        Provider:  (Not yet assigned)    Stacy MOELLER  DEANN          No new Assessment & Plan notes have been filed under this hospital service since the last note was generated.  Service: Hospital Medicine    Final Active Diagnoses:    Diagnosis Date Noted POA    PRINCIPAL PROBLEM:  Fluid overload, unspecified [E87.70]  Yes    COPD (chronic obstructive pulmonary disease) [J44.9]  Yes    Diarrhea [R19.7]  Yes    Hyperlipidemia [E78.5] 12/26/2013 Yes    HTN (hypertension) [I10] 04/12/2013 Yes    Atrial fibrillation [I48.91] 04/12/2013 Yes      Problems Resolved During this Admission:       Discharged Condition: stable    Disposition: Home or Self Care    Follow Up:   Follow-up Information     Wolf Coyne MD Follow up on 11/28/2022.    Specialty: Internal Medicine  Why: @2:30pm for a hospital follow up  Contact information:  [address]  4th [address]  [facility] [address]  [phone]                       Patient Instructions:      Activity as tolerated       Significant Diagnostic Studies: Labs:   BMP:   Recent Labs   Lab 11/19/22 2246 11/20/22  0609 11/21/22  0345   * 98 97  98    145 145  143   K 4.3 4.4 4.4  4.3   CL 99 96 95  95   CO2 35* 37* 38*  39*   BUN 14 13 16  16   CREATININE 0.8 0.7 0.8  0.8   CALCIUM 8.6* 9.1 8.7  8.9   MG 1.9 1.9 1.9   , CMP   Recent Labs   Lab 11/19/22 2246 11/20/22  0609 11/21/22  0345    145 145  143   K 4.3 4.4 4.4  4.3   CL 99 96 95  95   CO2 35* 37* 38*  39*   * 98 97  98   BUN 14 13 16  16   CREATININE 0.8 0.7 0.8  0.8   CALCIUM 8.6* 9.1 8.7  8.9   PROT 6.5 7.2 6.4   ALBUMIN 3.6 3.9 3.5   BILITOT 0.3 0.5 0.6   ALKPHOS 56 53* 52*   AST 11 15 15   ALT 15 17 17   ANIONGAP 6* 12 12  9    and CBC   Recent Labs   Lab 11/19/22 2246   WBC 7.54   HGB 13.4*   HCT 41.4        Radiology: X-Ray: CXR: X-Ray Chest 1 View (CXR): No results found for this visit on 11/19/22. and X-Ray Chest PA and Lateral (CXR): No results found for this visit on 11/19/22.  Cardiac Graphics:  "Echocardiogram:   2D echo with color flow doppler:   Results for orders placed or performed in visit on 04/12/17   2D echo with color flow doppler   Result Value Ref Range    Right Vent (Diastole) 2 0.8 - 2.6    Septum (Diastole) 1 0.6 - 1.2    Left Ventricle (Diastole) 5 3.5 - 5.5    Posterior Wall (Diastole) 0.9 0.6 - 1.2    Aortic Valve (Diastole) 2 1.5 - 2.6    Aortic Root (Diastole) 3.6 1.3 - 3.7    Left Atrium (Diastole) 4 2.5 - 4    Septum (Systole)  0.5 - 1.1    Left Ventricle (Systole) 3 2.2 - 4    Posteriol Wall (Systole)  0.5 - 1.2    EF + QEF 66 %    Aortic Valve Area  2.5 - 3.5 cm2    Aortic Peak Gradient 7 0 - 9.9 mm Hg    Aortic Mean Gradient 4 0 - 9.9 mm Hg    Aortic Peak Velocity 1.4 0 - 1.9 m/s    Aortic Pres. Half Time 392 (A) 599 - 999 m/sec    Mitral Valve Area 5 4 - 6 cm2    Mitral Pres. Half Time  30 - 60 m/sec    Mitral Valve E-A Ratio 0.6 (A) 0.75 - 999    Mitral Valve E-E prime 4 0 - 7    Pulmonary Artery Pressure 33 (A) 0 - 29 mm Hg    [facility]  " [facility] [employer]"  [address] [place] [address]  [id number]  Ph: [phone]     FAX: [phone]    Date of Service:  04/12/17    Complete Two Dimintional Echocardiogram with color flow doppler was performed.    Diagnosis: Aortic insufficieny     Findings:  Left ventricle is normal in size.  Left ventricle has normal systolic function with an ejection  fraction of 66%  There are no obvious wall motion abnormalities.  The left atrium is mildly dilated measuring 4.2 cm in size.  Normal diastolic function is noted.  The mitral valve appear normal with mitral annular calcification . There is mild mitral regurgitation  The aortic valve is mildly sclerotic with no aortic stenosis. There is moderate  aortic regurgitation with a pressure half time of 392.  The right ventricle is normal in size and function.  Pacer leads were noted in right heart.  There is mild tricuspid regurgitation " with an estimated pulmonary  pressure of 33 mmHg suggesting mild pulmonary hypertension.  The pulmonic valve was visualized and appears to have normal   anatomy. Mild pulmonary insuffiencey noted.  No pericardial or pleural effusion present.    Hiren Del Rosario MD    and Transthoracic echo (TTE) complete (Cupid Only):   Results for orders placed or performed during the hospital encounter of 11/19/22   Echo   Result Value Ref Range    BSA 2.08 m2    IVC diameter 2.40 cm    Left Ventricular Outflow Tract Mean Velocity 0.64 cm/s    Left Ventricular Outflow Tract Mean Gradient 1.80 mmHg    PV PEAK VELOCITY 0.91 cm/s    LVIDd 4.03 3.5 - 6.0 cm    IVS 1.39 (A) 0.6 - 1.1 cm    Posterior Wall 1.15 (A) 0.6 - 1.1 cm    LVIDs 2.97 2.1 - 4.0 cm    FS 26 28 - 44 %    Sinus 3.58 cm    STJ 2.43 cm    Ascending aorta 3.29 cm    LV mass 182.09 g    LA size 3.92 cm    RVDD 3.74 cm    TAPSE 1.19 cm    RV S' 0.01 cm/s    Left Ventricle Relative Wall Thickness 0.57 cm    AV mean gradient 2 mmHg    AV valve area 2.82 cm2    AV Velocity Ratio 0.84     AV index (prosthetic) 0.92     LVOT diameter 1.98 cm    LVOT area 3.1 cm2    LVOT peak navjot 0.88 m/s    LVOT peak VTI 14.30 cm    Ao peak navjot 1.05 m/s    Ao VTI 15.6 cm    LVOT stroke volume 44.01 cm3    AV peak gradient 4 mmHg    MV Peak E Navjot 0.65 m/s    TR Max Navjot 2.37 m/s    LV Systolic Volume 34.04 mL    LV Systolic Volume Index 16.5 mL/m2    LV Diastolic Volume 71.35 mL    LV Diastolic Volume Index 34.64 mL/m2    LV Mass Index 88 g/m2    RA Major Axis 5.95 cm    Left Atrium Minor Axis 6.01 cm    Left Atrium Major Axis 5.96 cm    Triscuspid Valve Regurgitation Peak Gradient 22 mmHg    LA WIDTH 4.50 cm    LA volume 89.74 cm3    LA Volume Index 43.6 mL/m2    RA Width 3.60 cm    Right Atrial Pressure (from IVC) 15 mmHg    EF 55 %    TV rest pulmonary artery pressure 37 mmHg    Narrative    · The left ventricle is normal in size with mild concentric hypertrophy   and normal systolic  function.  · The estimated ejection fraction is 55%.  · Normal right ventricular size with normal right ventricular systolic   function.  · The estimated PA systolic pressure is 37 mmHg.  · Atrial fibrillation observed.          Pending Diagnostic Studies:     None         Medications:  Reconciled Home Medications:      Medication List      CHANGE how you take these medications    carvediloL 3.125 MG tablet  Commonly known as: COREG  TAKE 1 TABLET BY MOUTH TWICE DAILY WITH A MEAL  What changed:   · how much to take  · when to take this     furosemide 40 MG tablet  Commonly known as: LASIX  Take 1 tablet (40 mg total) by mouth once daily.  What changed: when to take this        CONTINUE taking these medications    allopurinoL 300 MG tablet  Commonly known as: ZYLOPRIM  Take by mouth. 1 Tablet Oral Every day     aspirin 81 MG EC tablet  Commonly known as: ECOTRIN  Take 81 mg by mouth once daily.     azelastine 137 mcg (0.1 %) nasal spray  Commonly known as: ASTELIN  once as needed. 1 Aerosol, Spray Nasal     baclofen 10 MG tablet  Commonly known as: LIORESAL  Take 10 mg by mouth 4 (four) times daily as needed.     cetirizine 5 MG chewable tablet  Commonly known as: ZYRTEC  Take 5 mg by mouth once as needed for Allergies.     CRESTOR 10 MG tablet  Generic drug: rosuvastatin  TAKE ONE TABLET BY MOUTH DAILY     dextroamphetamine-amphetamine 20 mg tablet  Commonly known as: ADDERALL  TK 1 T PO D     dorzolamide 2 % ophthalmic solution  Commonly known as: TRUSOPT     ergocalciferol 50,000 unit Cap  Commonly known as: ERGOCALCIFEROL  Take 50,000 Units by mouth every 7 days.     finasteride 5 mg tablet  Commonly known as: PROSCAR  Take 5 mg by mouth every evening.     fluorometholone 0.1% 0.1 % Drps  Commonly known as: FML     fluticasone propionate 50 mcg/actuation nasal spray  Commonly known as: FLONASE  Inhale 1 spray into the lungs Daily.     gabapentin 300 MG capsule  Commonly known as: NEURONTIN  Take 300 mg by mouth 2  (two) times daily.     ketoconazole 2 % cream  Commonly known as: NIZORAL  Apply topically 2 (two) times daily. Apply  to rash twice a day     latanoprost 0.005 % ophthalmic solution     levocetirizine 5 MG tablet  Commonly known as: XYZAL  Take 5 mg by mouth every evening.     levothyroxine 50 MCG tablet  Commonly known as: SYNTHROID  Take 50 mcg by mouth before breakfast.     pantoprazole 40 MG tablet  Commonly known as: PROTONIX  Take 40 mg by mouth once daily.     PatanoL 0.1 % ophthalmic solution  Generic drug: olopatadine     tadalafiL 10 MG tablet  Commonly known as: CIALIS  Take 10 mg by mouth daily as needed for Erectile Dysfunction.     tamsulosin 0.4 mg Cap  Commonly known as: FLOMAX  Take 1 tablet by mouth once daily.     tiZANidine 2 mg Cap  Take 2 mg by mouth 3 (three) times daily.     traMADoL 50 mg tablet  Commonly known as: ULTRAM  Take 50 mg by mouth every 6 (six) hours as needed for Pain.     traZODone 50 MG tablet  Commonly known as: DESYREL  TK 1 T PO QHS        STOP taking these medications    amLODIPine 5 MG tablet  Commonly known as: NORVASC     potassium chloride 20 mEq Pack  Commonly known as: KLOR-CON        ASK your doctor about these medications    amiodarone 200 MG Tab  Commonly known as: PACERONE  TAKE 1 TABLET BY MOUTH DAILY     losartan 100 MG tablet  Commonly known as: COZAAR  Take 1 tablet (100 mg total) by mouth once daily.            Indwelling Lines/Drains at time of discharge:   Lines/Drains/Airways     None                 Time spent on the discharge of patient: less than 30  minutes         Slick Moeller MD  Department of Hospital Medicine  [facility]

## 2022-11-21 NOTE — PT/OT/SLP PROGRESS
Occupational Therapy   Treatment    Name: Humberto Lopez  MRN: [id number]  Admitting Diagnosis:  Fluid overload, unspecified       Recommendations:     Discharge Recommendations: home health OT (with caregiver assistance)  Discharge Equipment Recommendations:  none  Barriers to discharge:  None    Assessment:     Humberto Lopez is a 77 y.o. male with a medical diagnosis of Fluid overload, unspecified. Performance deficits affecting function are weakness, impaired endurance, decreased ROM, decreased coordination, impaired self care skills, decreased upper extremity function, decreased lower extremity function, impaired functional mobility, edema, impaired balance, impaired cardiopulmonary response to activity.     SBA bed mobility and scoot transfer bed>power chair. Pt participatory with BUE/BLE therapeutic exercises seated in chair on 1L O2- spO2 93-95%    Rehab Prognosis:  Good; patient would benefit from acute skilled OT services to address these deficits and reach maximum level of function.       Plan:     Patient to be seen 5 x/week to address the above listed problems via self-care/home management, therapeutic activities, therapeutic exercises  Plan of Care Expires: 12/04/22  Plan of Care Reviewed with: patient    Subjective     Chief complaint: none reported   Patient/family comments/ goals: family on the way to pick him up     Pain/Comfort:  Pain Rating 1: 0/10    Objective:     Communicated with: CNA prior to session.  Patient found HOB elevated with bed alarm, oxygen upon OT entry to room.    General Precautions: Standard, fall, respiratory   Orthopedic Precautions:N/A   Braces: N/A  Respiratory Status: Nasal cannula, flow 1 L/min     Occupational Performance:     Bed Mobility:    Patient completed Scooting anteriorly with stand by assistance  Patient completed Supine to Sit with stand by assistance, with side rail, and HOB elevated      Functional Mobility/Transfers:  Patient completed Bed > Power chair  Transfer using Scoot Pivot technique with stand by assistance with no assistive device  Functional Mobility: Gait belt donned prior to transfer for safety during mobility/transfers. Pt MOD I with use of power chair within the room. Pt demo'd trunk flexion with functional reach of LUE to reposition footplate to support BLE in power chair with supervision.     Activities of Daily Living:  Pt dressed ready for d/c       [facility] 6 Click ADL: 18    Treatment & Education:  Pt re-educated on OT role/POC.   Importance of OOB activity with staff assistance.  Safety during functional t/f and mobility   Seated upright in power chair, pt completed the following therapeutic exercises:   X10 LUE AROM: shoulder flex/ext, shoulder horizontal ab/dduction, elbow flex/ext  X10 RUE AROM: elbow flex/ext; AAROM shoulder flex/ext  X10 BLE AROM: ankle pumps, seated LAQ, seated marches  All questions/concerns answered within OT scope of practice       Patient left  in personal power chair with seat buckle in place and tray table next to pt  with all lines intact, call button in reach, CNA, Deedee, notified, and all needs met/within reach; door left open     GOALS:   Multidisciplinary Problems       Occupational Therapy Goals          Problem: Occupational Therapy    Goal Priority Disciplines Outcome Interventions   Occupational Therapy Goal     OT, PT/OT Ongoing, Progressing    Description: Goals to be met by: 12/04/22     Patient will increase functional independence with ADLs by performing:    Feeding with Modified [place].  UE Dressing with Modified [place].  LE Dressing with Modified [place].  Grooming while seated with Modified [place].  Toileting from toilet with Modified [place] for hygiene and clothing management.   Supine to sit with Modified [place].  Squat pivot transfers with Supervision.  Toilet transfer to bedside commode with Supervision.  Upper extremity exercise program x15 reps per handout, with  assistance as needed.                         Time Tracking:     OT Date of Treatment: 11/21/22  OT Start Time: 1413  OT Stop Time: 1428  OT Total Time (min): 15 min    Billable Minutes:Therapeutic Exercise 15 min    OT/ESTEPHANIA: OT     ESTEPHANIA Visit Number: 0    11/21/2022

## 2022-11-21 NOTE — NURSING
Discharge instructions, follow up appts, and prescription information given and explained to patient. Pt verbalizes understanding of all. Opportunity for questions given. IV removed, patient tolerated well. Patient awaiting transportation at this time.

## 2022-11-21 NOTE — PLAN OF CARE
Problem: Occupational Therapy  Goal: Occupational Therapy Goal  Description: Goals to be met by: 12/04/22     Patient will increase functional independence with ADLs by performing:    Feeding with Modified [place].  UE Dressing with Modified [place].  LE Dressing with Modified [place].  Grooming while seated with Modified [place].  Toileting from toilet with Modified [place] for hygiene and clothing management.   Supine to sit with Modified [place].  Squat pivot transfers with Supervision.  Toilet transfer to bedside commode with Supervision.  Upper extremity exercise program x15 reps per handout, with assistance as needed.    Outcome: Ongoing, Progressing     SBA bed mobility and scoot transfer bed>power chair. Pt participatory with BUE/BLE therapeutic exercises seated in chair on 1L O2- spO2 93-95%.

## 2022-11-21 NOTE — CONSULTS
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, incomplete        Social History     Socioeconomic History    Marital status:    Tobacco Use    Smoking status: Former    Smokeless tobacco: Never   Substance and Sexual Activity    Alcohol use: Yes     Comment: occasional    Drug use: No       Precautions:     Allergies as of 11/19/2022 - Reviewed 11/19/2022   Allergen Reaction Noted    No known drug allergies  03/15/2013       WO Assessment Details/Treatment   Consulted for altered skin integrity coccyx  A 77 year old male admitted to OBS 11/19/22 with fluid overload; diarrhea; COPD; HLD; Afib; HTN  11/19 WBC 7.54 Hgb 13.4 Hct 41.1  11/21 Alb 3.5   On Isoflex mattress; Moi score 14; incomplete quad  Admitted with Stage 1 coccyx pressure injury  Assessment:  Patient discharged before assessment of coccyx wound.  11/21/2022

## 2022-11-21 NOTE — NURSING
/61 HR 90; pt has scheduled Carvedilol 6.25mg, Gabapentin 300mg, and Hydralazine 50mg. Notified BEN Gabriel NP. Ok to hold scheduled meds. Will cont to monitor

## 2022-11-21 NOTE — PROGRESS NOTES
Referral sent via [facility] to arrange HHCS for pt.  TN to follow for response.

## 2022-11-21 NOTE — NURSING
pt complaining of nerve pain in his hands. pt states he takes baclofen and does not know amount he takes. Secure faisal Gabriel NP. Awaiting response

## 2022-11-21 NOTE — PLAN OF CARE
[facility] Surg      HOME HEALTH ORDERS  FACE TO FACE ENCOUNTER    Patient Name: Humberto Lopez  YOB: 1945    PCP: Wolf Coyne MD   PCP Address: [address] / [place] [address]  PCP Phone Number: [phone]  PCP Fax: [phone]    Encounter Date: 11/19/22    Admit to Home Health    Diagnoses:  Active Hospital Problems    Diagnosis  POA    *Fluid overload, unspecified [E87.70]  Yes    COPD (chronic obstructive pulmonary disease) [J44.9]  Yes    Diarrhea [R19.7]  Yes    Hyperlipidemia [E78.5]  Yes    HTN (hypertension) [I10]  Yes    Atrial fibrillation [I48.91]  Yes      Resolved Hospital Problems   No resolved problems to display.       Follow Up Appointments:  No future appointments.    Allergies:  Review of patient's allergies indicates:   Allergen Reactions    No known drug allergies        Medications: Review discharge medications with patient and family and provide education.      I have seen and examined this patient within the last 30 days. My clinical findings that support the need for the home health skilled services and home bound status are the following:no   Weakness/numbness causing balance and gait disturbance due to Weakness/Debility making it taxing to leave home.     Diet:   2 gram sodium diet        Referrals/ Consults  Physical Therapy to evaluate and treat. Evaluate for home safety and equipment needs; Establish/upgrade home exercise program. Perform / instruct on therapeutic exercises, gait training, transfer training, and Range of Motion.  Occupational Therapy to evaluate and treat. Evaluate home environment for safety and equipment needs. Perform/Instruct on transfers, ADL training, ROM, and therapeutic exercises.    Activities:   activity as tolerated    Nursing:   Agency to admit patient within 24 hours of hospital discharge unless specified on physician order or at patient request    SN to complete comprehensive assessment including routine  vital signs. Instruct on disease process and s/s of complications to report to MD. Review/verify medication list sent home with the patient at time of discharge  and instruct patient/caregiver as needed. Frequency may be adjusted depending on start of care date.     Skilled nurse to perform up to 3 visits PRN for symptoms related to diagnosis    Notify MD if SBP > 160 or < 90; DBP > 90 or < 50; HR > 120 or < 50; Temp > 101; O2 < 88%; Other:       Ok to schedule additional visits based on staff availability and patient request on consecutive days within the home health episode.    Miscellaneous   Routine Skin for Bedridden Patients: Instruct patient/caregiver to apply moisture barrier cream to all skin folds and wet areas in perineal area daily and after baths and all bowel movements.    Home Health Aide:  Nursing Twice weekly, Physical Therapy every 48 hours, Occupational Therapy every 48 hours, and Home Health Aide every 48 hours    Wound Care Orders  no    I certify that this patient is confined to his home and needs intermittent skilled nursing care, physical therapy, and occupational therapy.

## 2022-11-21 NOTE — HOSPITAL COURSE
patient with Hx of quraiplegia,WC bound,HTN,hyperlipoidemia,COPD,has been  placed for observation for fluid overload with elevated BNP,was started on IV lasix,and echo.show some pulmonary HTN,feel much better after diuresis,,swelling is improved,was stable on NC O 2.patient did well with PT,OT and HH at DC time is arranged,patient was discharged home with lasix and follow up with PCP as out patient.

## 2022-11-21 NOTE — PT/OT/SLP PROGRESS
Physical Therapy      Patient Name:  Humberto Lopez   MRN:  [id number]    Patient not seen today secondary to pt discharged prior to PT evaluation.

## 2022-11-22 PROCEDURE — G0180 PR HOME HEALTH MD CERTIFICATION: ICD-10-PCS | Mod: ,,, | Performed by: HOSPITALIST

## 2022-11-22 PROCEDURE — G0180 MD CERTIFICATION HHA PATIENT: HCPCS | Mod: ,,, | Performed by: HOSPITALIST

## 2023-01-26 ENCOUNTER — EXTERNAL HOME HEALTH (OUTPATIENT)
Dept: HOME HEALTH SERVICES | Facility: HOSPITAL | Age: 78
End: 2023-01-26
Payer: MEDICARE

## 2024-05-01 ENCOUNTER — OUTSIDE PLACE OF SERVICE (OUTPATIENT)
Dept: ADMINISTRATIVE | Facility: OTHER | Age: 79
End: 2024-05-01
Payer: MEDICARE

## 2024-05-29 ENCOUNTER — OUTSIDE PLACE OF SERVICE (OUTPATIENT)
Dept: ADMINISTRATIVE | Facility: OTHER | Age: 79
End: 2024-05-29
Payer: MEDICARE

## 2024-07-11 ENCOUNTER — OUTSIDE PLACE OF SERVICE (OUTPATIENT)
Dept: ADMINISTRATIVE | Facility: OTHER | Age: 79
End: 2024-07-11
Payer: MEDICARE

## 2024-07-31 ENCOUNTER — OUTSIDE PLACE OF SERVICE (OUTPATIENT)
Dept: ADMINISTRATIVE | Facility: OTHER | Age: 79
End: 2024-07-31
Payer: MEDICARE

## 2024-09-08 ENCOUNTER — OUTSIDE PLACE OF SERVICE (OUTPATIENT)
Dept: ADMINISTRATIVE | Facility: OTHER | Age: 79
End: 2024-09-08
Payer: MEDICARE

## 2024-11-07 ENCOUNTER — OUTSIDE PLACE OF SERVICE (OUTPATIENT)
Dept: ADMINISTRATIVE | Facility: OTHER | Age: 79
End: 2024-11-07
Payer: MEDICARE

## 2025-03-06 ENCOUNTER — OUTSIDE PLACE OF SERVICE (OUTPATIENT)
Dept: ADMINISTRATIVE | Facility: OTHER | Age: 80
End: 2025-03-06
Payer: MEDICARE

## 2025-03-12 ENCOUNTER — OUTSIDE PLACE OF SERVICE (OUTPATIENT)
Dept: ADMINISTRATIVE | Facility: OTHER | Age: 80
End: 2025-03-12
Payer: MEDICARE

## 2025-03-24 DIAGNOSIS — Z00.00 ENCOUNTER FOR MEDICARE ANNUAL WELLNESS EXAM: ICD-10-CM

## 2025-05-28 ENCOUNTER — OUTSIDE PLACE OF SERVICE (OUTPATIENT)
Dept: ADMINISTRATIVE | Facility: OTHER | Age: 80
End: 2025-05-28
Payer: MEDICARE